# Patient Record
Sex: FEMALE | Race: WHITE | Employment: FULL TIME | URBAN - METROPOLITAN AREA
[De-identification: names, ages, dates, MRNs, and addresses within clinical notes are randomized per-mention and may not be internally consistent; named-entity substitution may affect disease eponyms.]

---

## 2021-09-04 ENCOUNTER — OFFICE VISIT (OUTPATIENT)
Dept: FAMILY MEDICINE CLINIC | Facility: CLINIC | Age: 46
End: 2021-09-04
Payer: COMMERCIAL

## 2021-09-04 VITALS
WEIGHT: 182 LBS | TEMPERATURE: 98 F | SYSTOLIC BLOOD PRESSURE: 124 MMHG | DIASTOLIC BLOOD PRESSURE: 87 MMHG | HEIGHT: 67.5 IN | HEART RATE: 91 BPM | BODY MASS INDEX: 28.23 KG/M2 | RESPIRATION RATE: 16 BRPM

## 2021-09-04 DIAGNOSIS — L72.3 SEBACEOUS CYST: ICD-10-CM

## 2021-09-04 DIAGNOSIS — Z12.11 COLON CANCER SCREENING: ICD-10-CM

## 2021-09-04 DIAGNOSIS — E05.90 HYPERTHYROIDISM: ICD-10-CM

## 2021-09-04 DIAGNOSIS — E11.9 TYPE 2 DIABETES MELLITUS WITHOUT COMPLICATION, WITHOUT LONG-TERM CURRENT USE OF INSULIN (HCC): Primary | ICD-10-CM

## 2021-09-04 DIAGNOSIS — Z12.31 ENCOUNTER FOR SCREENING MAMMOGRAM FOR MALIGNANT NEOPLASM OF BREAST: ICD-10-CM

## 2021-09-04 DIAGNOSIS — E78.00 PURE HYPERCHOLESTEROLEMIA: ICD-10-CM

## 2021-09-04 LAB
ALBUMIN SERPL-MCNC: 3.5 G/DL (ref 3.4–5)
ALBUMIN/GLOB SERPL: 0.7 {RATIO} (ref 1–2)
ALP LIVER SERPL-CCNC: 97 U/L
ALT SERPL-CCNC: 62 U/L
ANION GAP SERPL CALC-SCNC: 8 MMOL/L (ref 0–18)
AST SERPL-CCNC: 40 U/L (ref 15–37)
BILIRUB SERPL-MCNC: 0.4 MG/DL (ref 0.1–2)
BUN BLD-MCNC: 11 MG/DL (ref 7–18)
BUN/CREAT SERPL: 15.3 (ref 10–20)
CALCIUM BLD-MCNC: 8.7 MG/DL (ref 8.5–10.1)
CARTRIDGE LOT#: 845 NUMERIC
CHLORIDE SERPL-SCNC: 105 MMOL/L (ref 98–112)
CHOLEST SMN-MCNC: 203 MG/DL (ref ?–200)
CO2 SERPL-SCNC: 24 MMOL/L (ref 21–32)
CREAT BLD-MCNC: 0.72 MG/DL
CREAT UR-SCNC: 49.1 MG/DL
GLOBULIN PLAS-MCNC: 4.9 G/DL (ref 2.8–4.4)
GLUCOSE BLD-MCNC: 271 MG/DL (ref 70–99)
HDLC SERPL-MCNC: 40 MG/DL (ref 40–59)
HEMOGLOBIN A1C: 11 % (ref 4.3–5.6)
LDLC SERPL CALC-MCNC: 144 MG/DL (ref ?–100)
M PROTEIN MFR SERPL ELPH: 8.4 G/DL (ref 6.4–8.2)
MICROALBUMIN UR-MCNC: 1.09 MG/DL
MICROALBUMIN/CREAT 24H UR-RTO: 22.2 UG/MG (ref ?–30)
NONHDLC SERPL-MCNC: 163 MG/DL (ref ?–130)
OSMOLALITY SERPL CALC.SUM OF ELEC: 293 MOSM/KG (ref 275–295)
PATIENT FASTING Y/N/NP: NO
PATIENT FASTING Y/N/NP: NO
POTASSIUM SERPL-SCNC: 4 MMOL/L (ref 3.5–5.1)
SODIUM SERPL-SCNC: 137 MMOL/L (ref 136–145)
TRIGL SERPL-MCNC: 102 MG/DL (ref 30–149)
TSI SER-ACNC: 1.39 MIU/ML (ref 0.36–3.74)
VLDLC SERPL CALC-MCNC: 19 MG/DL (ref 0–30)

## 2021-09-04 PROCEDURE — 83036 HEMOGLOBIN GLYCOSYLATED A1C: CPT | Performed by: FAMILY MEDICINE

## 2021-09-04 PROCEDURE — 3079F DIAST BP 80-89 MM HG: CPT | Performed by: FAMILY MEDICINE

## 2021-09-04 PROCEDURE — 3008F BODY MASS INDEX DOCD: CPT | Performed by: FAMILY MEDICINE

## 2021-09-04 PROCEDURE — 3046F HEMOGLOBIN A1C LEVEL >9.0%: CPT | Performed by: FAMILY MEDICINE

## 2021-09-04 PROCEDURE — 99204 OFFICE O/P NEW MOD 45 MIN: CPT | Performed by: FAMILY MEDICINE

## 2021-09-04 PROCEDURE — 3074F SYST BP LT 130 MM HG: CPT | Performed by: FAMILY MEDICINE

## 2021-09-04 PROCEDURE — 36415 COLL VENOUS BLD VENIPUNCTURE: CPT | Performed by: FAMILY MEDICINE

## 2021-09-04 PROCEDURE — 3061F NEG MICROALBUMINURIA REV: CPT | Performed by: INTERNAL MEDICINE

## 2021-09-04 RX ORDER — GLYBURIDE 5 MG/1
5 TABLET ORAL
Qty: 90 TABLET | Refills: 3 | Status: SHIPPED | OUTPATIENT
Start: 2021-09-04 | End: 2021-10-27

## 2021-09-04 RX ORDER — PROPRANOLOL HYDROCHLORIDE 10 MG/1
10 TABLET ORAL DAILY
COMMUNITY
Start: 2021-04-02 | End: 2021-09-04

## 2021-09-04 RX ORDER — METHIMAZOLE 5 MG/1
5 TABLET ORAL DAILY
COMMUNITY
End: 2021-09-04

## 2021-09-04 RX ORDER — METHIMAZOLE 5 MG/1
5 TABLET ORAL DAILY
Qty: 30 TABLET | Refills: 11 | Status: SHIPPED | OUTPATIENT
Start: 2021-09-04

## 2021-09-04 RX ORDER — ATORVASTATIN CALCIUM 20 MG/1
20 TABLET, FILM COATED ORAL NIGHTLY
Qty: 90 TABLET | Refills: 3 | Status: SHIPPED | OUTPATIENT
Start: 2021-09-04 | End: 2021-10-27

## 2021-09-04 RX ORDER — PROPRANOLOL HYDROCHLORIDE 10 MG/1
10 TABLET ORAL DAILY
Qty: 90 TABLET | Refills: 3 | Status: SHIPPED | OUTPATIENT
Start: 2021-09-04

## 2021-09-04 NOTE — PROGRESS NOTES
Eugenia Dutton is a 40year old female.   Patient presents with:  Establish Care  Cyst      HPI:   Patient is a 67-year-old female who presents today to discuss her hyperthyroidism, her uncontrolled diabetes with a hemoglobin A1c of 11, her infected sebace palpitations, swelling in feet  GI: denies abdominal pain and denies heartburn, nausea or vomiting  : No Pain on urination, change in the color of urine, discharge, urinating frequently  MUS: No back pain, joint pain, muscle pain  NEURO: denies headaches MG Oral Tab; Take 1 tablet (5 mg total) by mouth daily with breakfast.  Dispense: 90 tablet; Refill: 3  - DIABETIC EDUCATION - INTERNAL    4. Pure hypercholesterolemia  Patient has been on atorvastatin in the past.  Was DC'd for unknown reason.   Will check

## 2021-10-27 ENCOUNTER — HOSPITAL ENCOUNTER (OUTPATIENT)
Dept: MAMMOGRAPHY | Facility: HOSPITAL | Age: 46
Discharge: HOME | End: 2021-10-27
Attending: FAMILY MEDICINE
Payer: COMMERCIAL

## 2021-10-27 ENCOUNTER — OFFICE VISIT (OUTPATIENT)
Dept: ENDOCRINOLOGY CLINIC | Facility: CLINIC | Age: 46
End: 2021-10-27
Payer: COMMERCIAL

## 2021-10-27 ENCOUNTER — LAB ENCOUNTER (OUTPATIENT)
Dept: LAB | Facility: HOSPITAL | Age: 46
End: 2021-10-27
Attending: FAMILY MEDICINE
Payer: COMMERCIAL

## 2021-10-27 VITALS
DIASTOLIC BLOOD PRESSURE: 89 MMHG | RESPIRATION RATE: 18 BRPM | BODY MASS INDEX: 28.23 KG/M2 | HEIGHT: 67.5 IN | HEART RATE: 88 BPM | SYSTOLIC BLOOD PRESSURE: 128 MMHG | WEIGHT: 182 LBS

## 2021-10-27 DIAGNOSIS — E05.90 HYPERTHYROIDISM: Primary | ICD-10-CM

## 2021-10-27 DIAGNOSIS — E11.9 TYPE 2 DIABETES MELLITUS WITHOUT COMPLICATION, WITHOUT LONG-TERM CURRENT USE OF INSULIN (HCC): ICD-10-CM

## 2021-10-27 DIAGNOSIS — E05.90 HYPERTHYROIDISM: ICD-10-CM

## 2021-10-27 DIAGNOSIS — E78.5 HYPERLIPIDEMIA, UNSPECIFIED HYPERLIPIDEMIA TYPE: ICD-10-CM

## 2021-10-27 DIAGNOSIS — Z12.31 ENCOUNTER FOR SCREENING MAMMOGRAM FOR MALIGNANT NEOPLASM OF BREAST: ICD-10-CM

## 2021-10-27 PROCEDURE — 84443 ASSAY THYROID STIM HORMONE: CPT

## 2021-10-27 PROCEDURE — 3008F BODY MASS INDEX DOCD: CPT | Performed by: INTERNAL MEDICINE

## 2021-10-27 PROCEDURE — 77067 SCR MAMMO BI INCL CAD: CPT | Performed by: FAMILY MEDICINE

## 2021-10-27 PROCEDURE — 84445 ASSAY OF TSI GLOBULIN: CPT

## 2021-10-27 PROCEDURE — 3079F DIAST BP 80-89 MM HG: CPT | Performed by: INTERNAL MEDICINE

## 2021-10-27 PROCEDURE — 3074F SYST BP LT 130 MM HG: CPT | Performed by: INTERNAL MEDICINE

## 2021-10-27 PROCEDURE — 77063 BREAST TOMOSYNTHESIS BI: CPT | Performed by: FAMILY MEDICINE

## 2021-10-27 PROCEDURE — 99244 OFF/OP CNSLTJ NEW/EST MOD 40: CPT | Performed by: INTERNAL MEDICINE

## 2021-10-27 PROCEDURE — 84439 ASSAY OF FREE THYROXINE: CPT

## 2021-10-27 PROCEDURE — 84480 ASSAY TRIIODOTHYRONINE (T3): CPT

## 2021-10-27 PROCEDURE — 36415 COLL VENOUS BLD VENIPUNCTURE: CPT

## 2021-10-27 RX ORDER — ATORVASTATIN CALCIUM 20 MG/1
20 TABLET, FILM COATED ORAL NIGHTLY
Qty: 90 TABLET | Refills: 0 | Status: SHIPPED | OUTPATIENT
Start: 2021-10-27 | End: 2022-01-25

## 2021-10-27 RX ORDER — DULAGLUTIDE 0.75 MG/.5ML
0.75 INJECTION, SOLUTION SUBCUTANEOUS WEEKLY
Qty: 2 ML | Refills: 0 | Status: SHIPPED | OUTPATIENT
Start: 2021-10-27 | End: 2021-11-05

## 2021-10-27 RX ORDER — EMPAGLIFLOZIN 25 MG/1
25 TABLET, FILM COATED ORAL DAILY
Qty: 90 TABLET | Refills: 0 | Status: SHIPPED | OUTPATIENT
Start: 2021-10-27 | End: 2022-01-25

## 2021-10-27 RX ORDER — GLYBURIDE 5 MG/1
10 TABLET ORAL
Qty: 180 TABLET | Refills: 0 | Status: SHIPPED | OUTPATIENT
Start: 2021-10-27 | End: 2022-01-13

## 2021-10-27 NOTE — H&P
Name: Chago Oquendo  Date: 10/27/2021    Referring Physician: No ref. provider found    HISTORY OF PRESENT ILLNESS   Chago Oquendo is a 40year old female who presents for evaluation and management of type 2 diabetes.  She was diagnosed with diabetes ab glyBURIDE 5 MG Oral Tab, Take 1 tablet (5 mg total) by mouth daily with breakfast., Disp: 90 tablet, Rfl: 3     Allergies:   No Known Allergies    Social History:   Social History    Tobacco Use      Smoking status: Never Smoker      Smokeless tobacco: Nev 137 09/04/2021    K 4.0 09/04/2021     09/04/2021    CO2 24.0 09/04/2021     Lab Results   Component Value Date    CHOLEST 203 (H) 09/04/2021    TRIG 102 09/04/2021    HDL 40 09/04/2021     (H) 09/04/2021    VLDL 19 09/04/2021    NONHDLC 163 ( encounter, I spent over 20 minutes with preparing for the visit, reviewing documents from other specialties as well as from PCP, and going over test results.  During the face to face encounter, I spent an additional 30 minutes which were determined for hist

## 2021-11-05 DIAGNOSIS — E11.9 TYPE 2 DIABETES MELLITUS WITHOUT COMPLICATION, WITHOUT LONG-TERM CURRENT USE OF INSULIN (HCC): ICD-10-CM

## 2021-11-08 RX ORDER — DULAGLUTIDE 0.75 MG/.5ML
INJECTION, SOLUTION SUBCUTANEOUS
Qty: 2 ML | Refills: 2 | Status: SHIPPED | OUTPATIENT
Start: 2021-11-08

## 2021-12-14 ENCOUNTER — TELEPHONE (OUTPATIENT)
Dept: ENDOCRINOLOGY CLINIC | Facility: CLINIC | Age: 46
End: 2021-12-14

## 2021-12-15 NOTE — TELEPHONE ENCOUNTER
Hi!  Can we call this patient and ask her for how long she has been on the methimazole for? Please also let her know that I had reviewed her thyroid function tests and was waiting for her follow up visit to discuss them with her. Her thyroid function tests are within normal limits and depending upon how long she has been on the medication, we can decide whether or not she needs to continue it or not. Thank you!

## 2022-01-13 RX ORDER — GLYBURIDE 5 MG/1
TABLET ORAL
Qty: 180 TABLET | Refills: 0 | Status: SHIPPED | OUTPATIENT
Start: 2022-01-13

## 2022-01-13 NOTE — TELEPHONE ENCOUNTER
Hi!  Please call her and let her know that we will refill the medicatins this once, but if she cannot make a follow up appt, then we cant refill the medications anymore. Thank you!

## 2022-06-13 DIAGNOSIS — E11.9 TYPE 2 DIABETES MELLITUS WITHOUT COMPLICATION, WITHOUT LONG-TERM CURRENT USE OF INSULIN (HCC): ICD-10-CM

## 2022-06-14 NOTE — TELEPHONE ENCOUNTER
LOV 10/27/21  Called and got no answer. LMTCB. Letter sent on 2/15/22  Pended 1 month supply for review.      Please see refill request from 1/9/22

## 2022-06-15 RX ORDER — GLYBURIDE 5 MG/1
TABLET ORAL
Qty: 60 TABLET | Refills: 0 | Status: SHIPPED | OUTPATIENT
Start: 2022-06-15

## 2022-10-24 NOTE — TELEPHONE ENCOUNTER
Please review refill failed/no protocol     Requested Prescriptions     Pending Prescriptions Disp Refills    METFORMIN HCL 1000 MG Oral Tab [Pharmacy Med Name: METFORMIN 1000MG TABLETS] 180 tablet 3     Sig: TAKE 1 TABLET(1000 MG) BY MOUTH TWICE DAILY         Recent Visits  Date Type Provider Dept   09/04/21 Office Visit Barrie Garevy MD [facility]   Showing recent visits within past 540 days with a meds authorizing provider and meeting all other requirements  Future Appointments  No visits were found meeting these conditions.   Showing future appointments within next 150 days with a meds authorizing provider and meeting all other requirements    Requested Prescriptions   Pending Prescriptions Disp Refills    METFORMIN HCL 1000 MG Oral Tab [Pharmacy Med Name: METFORMIN 1000MG TABLETS] 180 tablet 3     Sig: TAKE 1 TABLET(1000 MG) BY MOUTH TWICE DAILY        Diabetes Medication Protocol Failed - 10/23/2022  8:00 AM        Failed - Last A1C < 7.5 and within past 6 months     Lab Results   Component Value Date    A1C 11.0 (A) 09/04/2021               Failed - In person appointment or virtual visit in the past 6 mos or appointment in next 3 mos       Recent Outpatient Visits              12 months ago Hyperthyroidism    [address] [facility] Margaret Zapata MD    Office Visit    1 year ago Type 2 diabetes mellitus without complication, without long-term current use of insulin [facility])    [address] [facility], [facility] Barrie Garvey MD    Office Visit                 Failed - EGFRCR or GFRNAA > 50     GFR Evaluation              Failed - GFR in the past 12 months              Recent Outpatient Visits              12 months ago Hyperthyroidism    [address] [facility] Margaret Zapata MD    Office Visit    1 year ago Type 2 diabetes mellitus without complication, without long-term current use of insulin [facility])    [facility] Flora Melvin MD Office Visit

## 2022-11-20 RX ORDER — PROPRANOLOL HYDROCHLORIDE 10 MG/1
TABLET ORAL
Qty: 90 TABLET | Refills: 3 | OUTPATIENT
Start: 2022-11-20

## 2022-11-28 RX ORDER — METHIMAZOLE 5 MG/1
5 TABLET ORAL DAILY
Qty: 90 TABLET | Refills: 0 | Status: SHIPPED | OUTPATIENT
Start: 2022-11-28

## 2022-11-28 RX ORDER — PROPRANOLOL HYDROCHLORIDE 10 MG/1
10 TABLET ORAL DAILY
Qty: 90 TABLET | Refills: 0 | Status: SHIPPED | OUTPATIENT
Start: 2022-11-28

## 2022-11-28 NOTE — TELEPHONE ENCOUNTER
Sent to wrong provider. Resent to Dr. Anny Funk. Pended. Metformin sent in on 10/25/22 for 3 months. He should have enough until January.

## 2023-02-03 NOTE — TELEPHONE ENCOUNTER
Please review. Protocol failed / No protocol.     Requested Prescriptions   Pending Prescriptions Disp Refills    METFORMIN HCL 1000 MG Oral Tab [Pharmacy Med Name: METFORMIN 1000MG TABLETS] 180 tablet 0     Sig: TAKE 1 TABLET(1000 MG) BY MOUTH TWICE DAILY       Diabetes Medication Protocol Failed - 2/3/2023  1:06 PM        Failed - Last A1C < 7.5 and within past 6 months     Lab Results   Component Value Date    A1C 11.0 (A) 09/04/2021             Failed - EGFRCR or GFRNAA > 50     GFR Evaluation            Failed - GFR in the past 12 months        Passed - In person appointment or virtual visit in the past 6 mos or appointment in next 3 mos     Recent Outpatient Visits              1 year ago Hyperthyroidism    Ashely Alvarenga MD    Office Visit    1 year ago Type 2 diabetes mellitus without complication, without long-term current use of insulin ([facility]    Steen Cushing, MD    Office Visit          Future Appointments         Provider Department Appt Notes    Tomorrow Major Mcdonnell MD [address] [facility], [facility], [facility] ck up --per Dr to come                     Recent Outpatient Visits              1 year ago Hyperthyroidism    Suma English MD    Office Visit    1 year ago Type 2 diabetes mellitus without complication, without long-term current use of insulin [facility])    Cathi Boyce, Chang Asencio MD    Office Visit             Future Appointments         Provider Department Appt Notes    Tomorrow MD Cathi Riley, [facility] ck up --per Dr to come

## 2023-02-04 ENCOUNTER — OFFICE VISIT (OUTPATIENT)
Dept: FAMILY MEDICINE CLINIC | Facility: CLINIC | Age: 48
End: 2023-02-04

## 2023-02-04 VITALS
HEART RATE: 89 BPM | BODY MASS INDEX: 27.13 KG/M2 | SYSTOLIC BLOOD PRESSURE: 122 MMHG | HEIGHT: 68 IN | WEIGHT: 179 LBS | TEMPERATURE: 98 F | DIASTOLIC BLOOD PRESSURE: 85 MMHG

## 2023-02-04 DIAGNOSIS — E11.9 TYPE 2 DIABETES MELLITUS WITHOUT COMPLICATION, WITHOUT LONG-TERM CURRENT USE OF INSULIN (HCC): Primary | ICD-10-CM

## 2023-02-04 DIAGNOSIS — L72.3 SEBACEOUS CYST: ICD-10-CM

## 2023-02-04 DIAGNOSIS — Z12.31 ENCOUNTER FOR SCREENING MAMMOGRAM FOR MALIGNANT NEOPLASM OF BREAST: ICD-10-CM

## 2023-02-04 DIAGNOSIS — E05.90 HYPERTHYROIDISM: ICD-10-CM

## 2023-02-04 DIAGNOSIS — E78.5 HYPERLIPIDEMIA, UNSPECIFIED HYPERLIPIDEMIA TYPE: ICD-10-CM

## 2023-02-04 LAB
ALBUMIN SERPL-MCNC: 3.7 G/DL (ref 3.4–5)
ALBUMIN/GLOB SERPL: 0.9 {RATIO} (ref 1–2)
ALP LIVER SERPL-CCNC: 82 U/L
ALT SERPL-CCNC: 142 U/L
ANION GAP SERPL CALC-SCNC: 7 MMOL/L (ref 0–18)
AST SERPL-CCNC: 119 U/L (ref 15–37)
BASOPHILS # BLD AUTO: 0.07 X10(3) UL (ref 0–0.2)
BASOPHILS NFR BLD AUTO: 0.9 %
BILIRUB SERPL-MCNC: 0.5 MG/DL (ref 0.1–2)
BUN BLD-MCNC: 10 MG/DL (ref 7–18)
BUN/CREAT SERPL: 12.5 (ref 10–20)
CALCIUM BLD-MCNC: 9.7 MG/DL (ref 8.5–10.1)
CARTRIDGE LOT#: 36 NUMERIC
CHLORIDE SERPL-SCNC: 109 MMOL/L (ref 98–112)
CHOLEST SERPL-MCNC: 196 MG/DL (ref ?–200)
CO2 SERPL-SCNC: 23 MMOL/L (ref 21–32)
CREAT BLD-MCNC: 0.8 MG/DL
CREAT UR-SCNC: 62.4 MG/DL
DEPRECATED RDW RBC AUTO: 45.7 FL (ref 35.1–46.3)
EOSINOPHIL # BLD AUTO: 0.16 X10(3) UL (ref 0–0.7)
EOSINOPHIL NFR BLD AUTO: 2.1 %
ERYTHROCYTE [DISTWIDTH] IN BLOOD BY AUTOMATED COUNT: 16.4 % (ref 11–15)
FASTING PATIENT LIPID ANSWER: NO
FASTING STATUS PATIENT QL REPORTED: NO
GFR SERPLBLD BASED ON 1.73 SQ M-ARVRAT: 92 ML/MIN/1.73M2 (ref 60–?)
GLOBULIN PLAS-MCNC: 4.3 G/DL (ref 2.8–4.4)
GLUCOSE BLD-MCNC: 301 MG/DL (ref 70–99)
HCT VFR BLD AUTO: 42.2 %
HDLC SERPL-MCNC: 43 MG/DL (ref 40–59)
HEMOGLOBIN A1C: 12.5 % (ref 4.3–5.6)
HGB BLD-MCNC: 12.7 G/DL
IMM GRANULOCYTES # BLD AUTO: 0.01 X10(3) UL (ref 0–1)
IMM GRANULOCYTES NFR BLD: 0.1 %
LDLC SERPL CALC-MCNC: 122 MG/DL (ref ?–100)
LYMPHOCYTES # BLD AUTO: 2.08 X10(3) UL (ref 1–4)
LYMPHOCYTES NFR BLD AUTO: 26.8 %
MCH RBC QN AUTO: 23.2 PG (ref 26–34)
MCHC RBC AUTO-ENTMCNC: 30.1 G/DL (ref 31–37)
MCV RBC AUTO: 77 FL
MICROALBUMIN UR-MCNC: 5.96 MG/DL
MICROALBUMIN/CREAT 24H UR-RTO: 95.5 UG/MG (ref ?–30)
MONOCYTES # BLD AUTO: 0.43 X10(3) UL (ref 0.1–1)
MONOCYTES NFR BLD AUTO: 5.5 %
NEUTROPHILS # BLD AUTO: 5.02 X10 (3) UL (ref 1.5–7.7)
NEUTROPHILS # BLD AUTO: 5.02 X10(3) UL (ref 1.5–7.7)
NEUTROPHILS NFR BLD AUTO: 64.6 %
NONHDLC SERPL-MCNC: 153 MG/DL (ref ?–130)
OSMOLALITY SERPL CALC.SUM OF ELEC: 298 MOSM/KG (ref 275–295)
PLATELET # BLD AUTO: 399 10(3)UL (ref 150–450)
POTASSIUM SERPL-SCNC: 4.4 MMOL/L (ref 3.5–5.1)
PROT SERPL-MCNC: 8 G/DL (ref 6.4–8.2)
RBC # BLD AUTO: 5.48 X10(6)UL
SODIUM SERPL-SCNC: 139 MMOL/L (ref 136–145)
TRIGL SERPL-MCNC: 172 MG/DL (ref 30–149)
TSI SER-ACNC: 0.94 MIU/ML (ref 0.36–3.74)
VLDLC SERPL CALC-MCNC: 31 MG/DL (ref 0–30)
WBC # BLD AUTO: 7.8 X10(3) UL (ref 4–11)

## 2023-02-04 PROCEDURE — 90677 PCV20 VACCINE IM: CPT | Performed by: FAMILY MEDICINE

## 2023-02-04 PROCEDURE — 3074F SYST BP LT 130 MM HG: CPT | Performed by: FAMILY MEDICINE

## 2023-02-04 PROCEDURE — 3008F BODY MASS INDEX DOCD: CPT | Performed by: FAMILY MEDICINE

## 2023-02-04 PROCEDURE — 3060F POS MICROALBUMINURIA REV: CPT | Performed by: FAMILY MEDICINE

## 2023-02-04 PROCEDURE — 99215 OFFICE O/P EST HI 40 MIN: CPT | Performed by: FAMILY MEDICINE

## 2023-02-04 PROCEDURE — 90715 TDAP VACCINE 7 YRS/> IM: CPT | Performed by: FAMILY MEDICINE

## 2023-02-04 PROCEDURE — 3061F NEG MICROALBUMINURIA REV: CPT | Performed by: FAMILY MEDICINE

## 2023-02-04 PROCEDURE — 90472 IMMUNIZATION ADMIN EACH ADD: CPT | Performed by: FAMILY MEDICINE

## 2023-02-04 PROCEDURE — 3046F HEMOGLOBIN A1C LEVEL >9.0%: CPT | Performed by: FAMILY MEDICINE

## 2023-02-04 PROCEDURE — 90471 IMMUNIZATION ADMIN: CPT | Performed by: FAMILY MEDICINE

## 2023-02-04 PROCEDURE — 3079F DIAST BP 80-89 MM HG: CPT | Performed by: FAMILY MEDICINE

## 2023-02-04 PROCEDURE — 83036 HEMOGLOBIN GLYCOSYLATED A1C: CPT | Performed by: FAMILY MEDICINE

## 2023-02-04 RX ORDER — GLYBURIDE 5 MG/1
10 TABLET ORAL
Qty: 60 TABLET | Refills: 0 | Status: SHIPPED | OUTPATIENT
Start: 2023-02-04

## 2023-02-04 RX ORDER — ATORVASTATIN CALCIUM 40 MG/1
40 TABLET, FILM COATED ORAL NIGHTLY
Qty: 90 TABLET | Refills: 3 | Status: SHIPPED | OUTPATIENT
Start: 2023-02-04

## 2023-02-04 RX ORDER — DULAGLUTIDE 0.75 MG/.5ML
0.75 INJECTION, SOLUTION SUBCUTANEOUS WEEKLY
Qty: 2 ML | Refills: 2 | Status: SHIPPED | OUTPATIENT
Start: 2023-02-04

## 2023-02-16 RX ORDER — LOSARTAN POTASSIUM 50 MG/1
50 TABLET ORAL DAILY
Qty: 90 TABLET | Refills: 3 | Status: SHIPPED | OUTPATIENT
Start: 2023-02-16

## 2023-02-22 RX ORDER — PROPRANOLOL HYDROCHLORIDE 10 MG/1
10 TABLET ORAL DAILY
Qty: 90 TABLET | Refills: 1 | Status: SHIPPED | OUTPATIENT
Start: 2023-02-22

## 2023-02-22 RX ORDER — METHIMAZOLE 5 MG/1
5 TABLET ORAL DAILY
Qty: 90 TABLET | Refills: 1 | Status: SHIPPED | OUTPATIENT
Start: 2023-02-22

## 2023-02-22 NOTE — TELEPHONE ENCOUNTER
Refill passed per [facility] protocol    Requested Prescriptions   Pending Prescriptions Disp Refills    METHIMAZOLE 5 MG Oral Tab [Pharmacy Med Name: METHIMAZOLE 5MG TABLETS] 90 tablet 0     Sig: TAKE 1 TABLET(5 MG) BY MOUTH DAILY       Hyperthyroid Medication Protocol Passed - 2/22/2023  1:05 PM        Passed - TSH resulted in past 6 months        Passed - In person appointment or virtual visit in the past 6 mos or appointment in next 3 mos     Recent Outpatient Visits              2 weeks ago Type 2 diabetes mellitus without complication, without long-term current use of insulin ([facility] [address] [facility]    [address], [address], Peggy Del Real MD    Office Visit    1 year ago Hyperthyroidism    [address], [address], [place], Ned Barron MD    Office Visit    1 year ago Type 2 diabetes mellitus without complication, without long-term current use of insulin [facility])    [address], [place] [address], Daja Theodore MD    Office Visit          Future Appointments         Provider Department Appt Notes    In 3 days [address] [place] [address] Rd     In 3 weeks Kathya Tuttle, [address], [address], Daja sanchez     In 3 weeks Warden Karyn MD [address], [address], Daja sanchez

## 2023-02-22 NOTE — TELEPHONE ENCOUNTER
Refill passed per [facility] [employer] protocol. Requested Prescriptions   Signed Prescriptions Disp Refills    propranolol 10 MG Oral Tab 90 tablet 1     Sig: Take 1 tablet (10 mg total) by mouth daily.        Hypertensive Medications Protocol Passed - 2/22/2023 12:55 PM        Passed - In person appointment in the past 12 or next 3 months     Recent Outpatient Visits              2 weeks ago Type 2 diabetes mellitus without complication, without long-term current use of insulin ([facility]    [address], [facility], Tena Estrada MD    Office Visit    1 year ago [facility], [address], [place], Koffi Leigh MD    Office Visit    1 year ago Type 2 diabetes mellitus without complication, without long-term current use of insulin ([facility]    [address], [facility], Daja Loya MD    Office Visit          Future Appointments         Provider Department Appt Notes    In 3 days [address] [place] [address]     In 3 weeks Davian Potts, [address], [place] [address], Daja sanchez     In 3 weeks Dariana Link MD [address], [place] [address], [facility] BP reading less than 140/90     BP Readings from Last 1 Encounters:  02/04/23 : 122/85              Passed - CMP or BMP in past 6 months     Recent Results (from the past 4392 hour(s))   COMP METABOLIC PANEL (14)    Collection Time: 02/04/23 10:02 AM   Result Value Ref Range    Glucose 301 (H) 70 - 99 mg/dL    Sodium 139 136 - 145 mmol/L    Potassium 4.4 3.5 - 5.1 mmol/L    Chloride 109 98 - 112 mmol/L    CO2 23.0 21.0 - 32.0 mmol/L    Anion Gap 7 0 - 18 mmol/L    BUN 10 7 - 18 mg/dL    Creatinine 0.80 0.55 - 1.02 mg/dL    BUN/CREA Ratio 12.5 10.0 - 20.0    Calcium, Total 9.7 8.5 - 10.1 mg/dL    Calculated Osmolality 298 (H) 275 - 295 mOsm/kg    eGFR-Cr 92 >=60 mL/min/1.73m2    ALT 142 (H) 13 - 56 U/L     (H) 15 - 37 U/L    Alkaline Phosphatase 82 39 - 100 U/L    Bilirubin, Total 0.5 0.1 - 2.0 mg/dL    Total Protein 8.0 6.4 - 8.2 g/dL    Albumin 3.7 3.4 - 5.0 g/dL    Globulin  4.3 2.8 - 4.4 g/dL    A/G Ratio 0.9 (L) 1.0 - 2.0    Patient Fasting for CMP? No      *Note: Due to a large number of results and/or encounters for the requested time period, some results have not been displayed. A complete set of results can be found in Results Review.                Passed - In person appointment or virtual visit in the past 6 months     Recent Outpatient Visits              2 weeks ago Type 2 diabetes mellitus without complication, without long-term current use of insulin ([facility] [address]    [address], Bennett Roth MD    Office Visit    1 year ago Hyperthyroidism    Ophel[address] Manzo, [address], [place], Ricardo De Souza MD    Office Visit    1 year ago Type 2 diabetes mellitus without complication, without long-term current use of insulin [facility])    [address], [address] Rahul Thompson MD    Office Visit          Future Appointments         Provider Department Appt Notes    In 3 days [address] [place] [address] Rd     In 3 weeks [facility], [address] [place] [address], [address], [address] [address]     In 3 weeks Jorje Reid MD [address] [facility], [address] or GFRNAA > 50     GFR Evaluation  EGFRCR: 92 , resulted on 2/4/2023                Recent Outpatient Visits              2 weeks ago Type 2 diabetes mellitus without complication, without long-term current use of insulin [facility])    Moon Delcid MD    Office Visit    1 year ago Hyperthyroidism    [address] [facility], Deisi Sibley MD    Office Visit    1 year ago Type 2 diabetes mellitus without complication, without long-term current use of insulin ([facility]    [address] N[address]co, Daja Hampton MD    Office Visit            Future Appointments         Provider Department Appt Notes    In 3 days  [address]     In 3 weeks Marlo Ang, [address], [place], Daja sanchez     In 3 weeks MD Wing Tobias [address], Daja sanchez

## 2023-03-22 DIAGNOSIS — E11.9 TYPE 2 DIABETES MELLITUS WITHOUT COMPLICATION, WITHOUT LONG-TERM CURRENT USE OF INSULIN (HCC): ICD-10-CM

## 2023-03-23 RX ORDER — GLYBURIDE 5 MG/1
TABLET ORAL
Qty: 60 TABLET | Refills: 0 | Status: SHIPPED | OUTPATIENT
Start: 2023-03-23

## 2023-03-23 NOTE — TELEPHONE ENCOUNTER
Please review. Protocol failed. Appears to have cancelled her appointment with endocrinology that was advised.   Requested Prescriptions   Pending Prescriptions Disp Refills    GLYBURIDE 5 MG Oral Tab [Pharmacy Med Name: GLYBURIDE 5MG TABLETS] 60 tablet 0     Sig: TAKE 2 TABLETS(10 MG) BY MOUTH DAILY WITH BREAKFAST       Diabetes Medication Protocol Failed - 3/22/2023 12:27 PM        Failed - Last A1C < 7.5 and within past 6 months     Lab Results   Component Value Date    A1C 12.5 (A) 02/04/2023             Passed - In person appointment or virtual visit in the past 6 mos or appointment in next 3 mos     Recent Outpatient Visits              1 month ago Type 2 diabetes mellitus without complication, without long-term current use of insulin ([facility] [address] [facility]    Ashish Davila MD    Office Visit    1 year ago Hyperthyroidism    [address] [facility], Andre Goodwin MD    Office Visit    1 year ago Type 2 diabetes mellitus without complication, without long-term current use of insulin [facility])    Ashish Davila MD    Office Visit                      Passed - [facility] or GFRNAA > 50     GFR Evaluation  EGFRCR: 92 , resulted on 2/4/2023          Passed - GFR in the past 12 months

## 2023-04-29 DIAGNOSIS — E11.9 TYPE 2 DIABETES MELLITUS WITHOUT COMPLICATION, WITHOUT LONG-TERM CURRENT USE OF INSULIN (HCC): ICD-10-CM

## 2023-05-02 RX ORDER — GLYBURIDE 5 MG/1
TABLET ORAL
Qty: 60 TABLET | Refills: 0 | Status: SHIPPED | OUTPATIENT
Start: 2023-05-02

## 2023-05-02 NOTE — TELEPHONE ENCOUNTER
Please review; protocol failed.  Or has no protocol    Requested Prescriptions   Pending Prescriptions Disp Refills    GLYBURIDE 5 MG Oral Tab [Pharmacy Med Name: GLYBURIDE 5MG TABLETS] 60 tablet 0     Sig: TAKE 2 TABLETS(10 MG) BY MOUTH DAILY WITH BREAKFAST       Diabetes Medication Protocol Failed - 4/29/2023  7:44 PM        Failed - Last A1C < 7.5 and within past 6 months     Lab Results   Component Value Date    A1C 12.5 (A) 02/04/2023               Passed - In person appointment or virtual visit in the past 6 mos or appointment in next 3 mos     Recent Outpatient Visits              2 months ago Type 2 diabetes mellitus without complication, without long-term current use of insulin ([facility]    [address], [facility], Bennett Roth MD    Office Visit    1 year ago Hyperthyroidism    [address], [address] [facility],[address], [place], Ricardo De Souza MD    Office Visit    1 year ago Type 2 diabetes mellitus without complication, without long-term current use of insulin [facility])    [address], [facility], [facility] Rahul Thompson MD    Office Visit          Future Appointments         Provider Department Appt Notes    In 1 week Rahul Thompson MD [address], [facility] [address], [facility] followup on DM; pt had to cancel w/ Dr Horacio Vasquez who is not available when pt is  (policy informed)    In 1 week Jorje Reid MD [address], [facility] [address], [facility] consult Sebaceous cyst, back, policy informed               Passed - EGFRCR or GFRNAA > 50     GFR Evaluation  EGFRCR: 92 , resulted on 2/4/2023            Passed - GFR in the past 12 months              Recent Outpatient Visits              2 months ago Type 2 diabetes mellitus without complication, without long-term current use of insulin [facility])    Moon Delcid MD    Office Visit    1 year ago Hyperthyroidism [address] [facility], Ayanna Roland MD    Office Visit    1 year ago Type 2 diabetes mellitus without complication, without long-term current use of insulin [facility])    [address], [facility], [facility] Sabrina Sanchez MD    Office Visit           Future Appointments         Provider Department Appt Notes    In 1 week Sabrina Sanchez  [facility] followup on DM; pt had to cancel w/ Dr Kat White who is not available when pt is  (policy informed)    In 1 week Lis Ruiz MD [address], [facility] [address], [facility] consult Sebaceous cyst, back, policy informed

## 2023-05-23 NOTE — TELEPHONE ENCOUNTER
Please review; protocol failed/no protocol.      Requested Prescriptions   Pending Prescriptions Disp Refills    METFORMIN HCL 1000 MG Oral Tab [Pharmacy Med Name: METFORMIN 1000MG TABLETS] 180 tablet 0     Sig: TAKE 1 TABLET(1000 MG) BY MOUTH TWICE DAILY       Diabetes Medication Protocol Failed - 5/21/2023  3:12 PM        Failed - Last A1C < 7.5 and within past 6 months     Lab Results   Component Value Date    A1C 12.5 (A) 02/04/2023               Passed - In person appointment or virtual visit in the past 6 mos or appointment in next 3 mos     Recent Outpatient Visits              3 months ago Type 2 diabetes mellitus without complication, without long-term current use of insulin ([facility] [address]    [facility], Ian Vivar MD    Office Visit    1 year ago Hyperthyroidism    Arturo Hughes, [address] [facility], Stefano Denver, MD    Office Visit    1 year ago Type 2 diabetes mellitus without complication, without long-term current use of insulin [facility])    [facility] Derek Morales [facility]    Office Visit          Future Appointments         Provider Department Appt Notes    Tomorrow MD Arturo Wallace [facility] 3 month followup policy informed to pt    In 2 weeks MD Arturo Chin [facility] consult Sebaceous cyst, back, policy informed               Passed - EGFRCR or GFRNAA > 50     GFR Evaluation  EGFRCR: 92 , resulted on 2/4/2023            Passed - GFR in the past 12 months              Recent Outpatient Visits              3 months ago Type 2 diabetes mellitus without complication, without long-term current use of insulin [facility])    Ophelia Reid MD    Office Visit    1 year ago Hyperthyroidism    Arturo Hughes, [address], [place], [place] MD DESIREE    Office Visit    1 year ago Type 2 diabetes mellitus without complication, without long-term current use of insulin ([facility]    [facility], [address], [facility] Renetta Cao MD    Office Visit             Future Appointments         Provider Department Appt Notes    Tomorrow MD Lea Wells, [facility] 3 month followup policy informed to pt    In 2 weeks Conception MD Aditya Lealianet [place], [facility] consult Sebaceous cyst, back, policy informed

## 2023-05-29 RX ORDER — METHIMAZOLE 5 MG/1
5 TABLET ORAL DAILY
Qty: 30 TABLET | Refills: 3 | Status: SHIPPED | OUTPATIENT
Start: 2023-05-29

## 2023-05-29 NOTE — TELEPHONE ENCOUNTER
Refill passed per [place] [employer] protocol.   Requested Prescriptions   Pending Prescriptions Disp Refills    METHIMAZOLE 5 MG Oral Tab [Pharmacy Med Name: METHIMAZOLE 5MG TABLETS] 30 tablet 0     Sig: TAKE 1 TABLET(5 MG) BY MOUTH DAILY       Hyperthyroid Medication Protocol Passed - 5/28/2023  1:17 PM        Passed - TSH resulted in past 6 months        Passed - In person appointment or virtual visit in the past 6 mos or appointment in next 3 mos     Recent Outpatient Visits              3 months ago Type 2 diabetes mellitus without complication, without long-term current use of insulin ([facility]    [facility], [address], Yajaira Elise MD    Office Visit    1 year ago Hyperthyroidism    [address], [address], [place], Ayanna Roland MD    Office Visit    1 year ago Type 2 diabetes mellitus without complication, without long-term current use of insulin [facility])    [address], [address], Daja Sanchez MD    Office Visit          Future Appointments         Provider Department Appt Notes    In 1 week Lis Ruiz MD [address], [address], Daja sanchez consult Sebaceous cyst, back, policy informed                  Recent Outpatient Visits              3 months ago Type 2 diabetes mellitus without complication, without long-term current use of insulin [facility])    [address], Enrique George MD    Office Visit    1 year ago Hyperthyroidism    [address], [address], Christine Marroquin MD    Office Visit    1 year ago Type 2 diabetes mellitus without complication, without long-term current use of insulin [facility])    [address], [address], Daja Sanchez MD    Office Visit          Future Appointments         Provider Department Appt Notes    In 1 week Lis Ruiz MD [address] [facility] [address], [facility] consult Sebaceous cyst, back, policy informed

## 2023-06-07 ENCOUNTER — OFFICE VISIT (OUTPATIENT)
Dept: SURGERY | Facility: CLINIC | Age: 48
End: 2023-06-07

## 2023-06-07 VITALS — HEART RATE: 98 BPM | SYSTOLIC BLOOD PRESSURE: 127 MMHG | DIASTOLIC BLOOD PRESSURE: 86 MMHG

## 2023-06-07 DIAGNOSIS — L72.0 EPIDERMOID CYST: Primary | ICD-10-CM

## 2023-06-07 PROCEDURE — 3079F DIAST BP 80-89 MM HG: CPT | Performed by: SURGERY

## 2023-06-07 PROCEDURE — 99203 OFFICE O/P NEW LOW 30 MIN: CPT | Performed by: SURGERY

## 2023-06-07 PROCEDURE — 3074F SYST BP LT 130 MM HG: CPT | Performed by: SURGERY

## 2023-06-07 PROCEDURE — 11402 EXC TR-EXT B9+MARG 1.1-2 CM: CPT | Performed by: SURGERY

## 2023-06-07 NOTE — PROCEDURES
Surgery procedure note    Timeout performed and consent signed back prepped and draped with Betadine in a sterile fashion. 1% lidocaine with epinephrine infiltrated circumferentially. 2 cm elliptical incision is made and the cyst is completely excised including old scar. Hemostasis maintained with compression incision closed in interrupted 4-0 Prolene. Sterile dressing and Tegaderm applied. She tolerated this well.

## 2023-06-07 NOTE — PATIENT INSTRUCTIONS
Ice pack as needed. May shower in 24 hours. Tylenol or ibuprofen for discomfort. Avoid vigorous exercise 1 week    Remove outer Tegaderm dressing in 48 hours, cover sutures with a Band-Aid.   Follow-up 1 week for suture removal

## 2023-06-14 ENCOUNTER — OFFICE VISIT (OUTPATIENT)
Dept: SURGERY | Facility: CLINIC | Age: 48
End: 2023-06-14

## 2023-06-14 DIAGNOSIS — Z98.890 POST-OPERATIVE STATE: Primary | ICD-10-CM

## 2023-06-14 PROCEDURE — 99024 POSTOP FOLLOW-UP VISIT: CPT | Performed by: SURGERY

## 2023-06-14 NOTE — PROGRESS NOTES
Postoperative Patient Follow-up      6/14/2023    DINA      Arlo Dakin is a 56year old female postop after excision of recurrent epidermoid cyst.  She is here today for suture removal.      Exam  Wound is clean dry intact  Sutures removed      Assessment/Plan  Assessment   Post-operative state  (primary encounter diagnosis)    Follow-up for problems         Cleve Hinojosa MD

## 2023-07-28 ENCOUNTER — TELEPHONE (OUTPATIENT)
Dept: FAMILY MEDICINE CLINIC | Facility: CLINIC | Age: 48
End: 2023-07-28

## 2023-07-28 NOTE — TELEPHONE ENCOUNTER
Patient is requesting an appointment for a Hospital F/U. Patient is requesting as soon as possible. Patient taking care of mother, and lost her  last week. Patient goes back to work 8-4, would like to see her before that.   Please call [phone]

## 2023-07-31 NOTE — TELEPHONE ENCOUNTER
Patient called requesting a call back she stated will be returning back to work and is requesting this to be a high priority for reason for missing visit of 07/20 was passing in the family and patient is taking care of mother

## 2023-08-01 NOTE — TELEPHONE ENCOUNTER
Appointment scheduled tomorrow 8/2/23 at 9:40 with Dr. Karla Dietrich.  Patient aware location is at [facility]

## 2023-08-02 ENCOUNTER — OFFICE VISIT (OUTPATIENT)
Dept: FAMILY MEDICINE CLINIC | Facility: CLINIC | Age: 48
End: 2023-08-02

## 2023-08-02 VITALS
DIASTOLIC BLOOD PRESSURE: 84 MMHG | SYSTOLIC BLOOD PRESSURE: 112 MMHG | HEART RATE: 83 BPM | BODY MASS INDEX: 28 KG/M2 | WEIGHT: 187 LBS

## 2023-08-02 DIAGNOSIS — E11.9 TYPE 2 DIABETES MELLITUS WITHOUT COMPLICATION, WITHOUT LONG-TERM CURRENT USE OF INSULIN (HCC): Primary | ICD-10-CM

## 2023-08-02 LAB
CARTRIDGE LOT#: 603 NUMERIC
HEMOGLOBIN A1C: 9.2 % (ref 4.3–5.6)

## 2023-08-02 PROCEDURE — 99214 OFFICE O/P EST MOD 30 MIN: CPT | Performed by: FAMILY MEDICINE

## 2023-08-02 PROCEDURE — 3079F DIAST BP 80-89 MM HG: CPT | Performed by: FAMILY MEDICINE

## 2023-08-02 PROCEDURE — 3074F SYST BP LT 130 MM HG: CPT | Performed by: FAMILY MEDICINE

## 2023-08-02 PROCEDURE — 83036 HEMOGLOBIN GLYCOSYLATED A1C: CPT | Performed by: FAMILY MEDICINE

## 2023-08-02 PROCEDURE — 3046F HEMOGLOBIN A1C LEVEL >9.0%: CPT | Performed by: FAMILY MEDICINE

## 2023-08-02 RX ORDER — DULAGLUTIDE 0.75 MG/.5ML
0.75 INJECTION, SOLUTION SUBCUTANEOUS WEEKLY
Qty: 2 ML | Refills: 2 | Status: SHIPPED | OUTPATIENT
Start: 2023-08-02

## 2023-08-02 RX ORDER — ATORVASTATIN CALCIUM 40 MG/1
40 TABLET, FILM COATED ORAL NIGHTLY
Qty: 90 TABLET | Refills: 3 | Status: SHIPPED | OUTPATIENT
Start: 2023-08-02

## 2023-08-02 RX ORDER — PREDNISONE 20 MG/1
TABLET ORAL
COMMUNITY
Start: 2023-07-26 | End: 2023-08-04

## 2023-08-05 NOTE — TELEPHONE ENCOUNTER
A1C out of range for protocol, please advise on refill request.    Requested Prescriptions     Pending Prescriptions Disp Refills    METFORMIN HCL 1000 MG Oral Tab [Pharmacy Med Name: METFORMIN 1000MG TABLETS] 180 tablet 0     Sig: TAKE 1 TABLET(1000 MG) BY MOUTH TWICE DAILY      Recent Visits  Date Type Provider Dept   08/02/23 Office Visit Umair Domingo MD [facility]   02/04/23 Office Visit Umair Domingo MD [facility]   Showing recent visits within past 540 days with a meds authorizing provider and meeting all other requirements  Future Appointments  Date Type Provider Dept   08/08/23 Appointment Umair Domingo MD [facility]   Showing future appointments within next 150 days with a meds authorizing provider and meeting all other requirements     Requested Prescriptions   Pending Prescriptions Disp Refills    METFORMIN HCL 1000 MG Oral Tab [Pharmacy Med Name: METFORMIN 1000MG TABLETS] 180 tablet 0     Sig: TAKE 1 TABLET(1000 MG) BY MOUTH TWICE DAILY       Diabetes Medication Protocol Failed - 8/4/2023 10:05 AM        Failed - Last A1C < 7.5 and within past 6 months     Lab Results   Component Value Date    A1C 9.2 (A) 08/02/2023             Passed - In person appointment or virtual visit in the past 6 mos or appointment in next 3 mos     Recent Outpatient Visits              3 days ago Type 2 diabetes mellitus without complication, without long-term current use of insulin [facility])    [address] Umair Domingo MD    Office Visit    1 month ago Post-operative state    [address], Camila Conley MD    Office Visit    1 month ago Epidermoid cyst    [address] [place] [address], Camila Conley MD    Office Visit    6 months ago Type 2 diabetes mellitus without complication, without long-term current use of insulin [facility])    [address], [address] Larissa Theodore MD    Office Visit    1 year ago Hyperthyroidism    Marla Martínez, [address], [place], Ned Barron MD    Office Visit          Future Appointments         Provider Department Appt Notes    In 3 days MD Marla Esquivel [address], [facility] follow up - per MD               Passed - EGFRCR or GFRNAA > 50     GFR Evaluation  EGFRCR: 92 , resulted on 2/4/2023          Passed - GFR in the past 12 months            Future Appointments         Provider Department Appt Notes    In 3 days MD Marla Esquivel [address], [facility] follow up - per MD           Recent Outpatient Visits              3 days ago Type 2 diabetes mellitus without complication, without long-term current use of insulin [facility])    Mei Jack MD    Office Visit    1 month ago Post-operative state    Alana Jack MD    Office Visit    1 month ago Epidermoid cyst    Brnadi Acevedo MD    Office Visit    6 months ago Type 2 diabetes mellitus without complication, without long-term current use of insulin [facility])    Mei Jack MD    Office Visit    1 year ago Hyperthyroidism    [address] [facility], Lilian Khan MD    Office Visit

## 2023-08-08 ENCOUNTER — TELEPHONE (OUTPATIENT)
Dept: FAMILY MEDICINE CLINIC | Facility: CLINIC | Age: 48
End: 2023-08-08

## 2023-08-08 ENCOUNTER — OFFICE VISIT (OUTPATIENT)
Dept: FAMILY MEDICINE CLINIC | Facility: CLINIC | Age: 48
End: 2023-08-08

## 2023-08-08 VITALS
DIASTOLIC BLOOD PRESSURE: 83 MMHG | WEIGHT: 187 LBS | BODY MASS INDEX: 28 KG/M2 | HEART RATE: 88 BPM | SYSTOLIC BLOOD PRESSURE: 124 MMHG

## 2023-08-08 DIAGNOSIS — F43.21 GRIEF REACTION: ICD-10-CM

## 2023-08-08 DIAGNOSIS — E11.9 TYPE 2 DIABETES MELLITUS WITHOUT COMPLICATION, WITHOUT LONG-TERM CURRENT USE OF INSULIN (HCC): Primary | ICD-10-CM

## 2023-08-08 DIAGNOSIS — Z12.11 COLON CANCER SCREENING: ICD-10-CM

## 2023-08-08 DIAGNOSIS — R07.89 ATYPICAL CHEST PAIN: ICD-10-CM

## 2023-08-08 PROCEDURE — 3079F DIAST BP 80-89 MM HG: CPT | Performed by: FAMILY MEDICINE

## 2023-08-08 PROCEDURE — 99215 OFFICE O/P EST HI 40 MIN: CPT | Performed by: FAMILY MEDICINE

## 2023-08-08 PROCEDURE — 3074F SYST BP LT 130 MM HG: CPT | Performed by: FAMILY MEDICINE

## 2023-08-08 RX ORDER — INSULIN GLARGINE 100 [IU]/ML
20 INJECTION, SOLUTION SUBCUTANEOUS NIGHTLY
Qty: 6 EACH | Refills: 11 | Status: SHIPPED | OUTPATIENT
Start: 2023-08-08

## 2023-08-08 NOTE — TELEPHONE ENCOUNTER
Patient calling to request a revision to her return to work note, she would like it to include \"allow breaks as needed\" still to return to work 8/14/2023  Please fax to her home at fax# [phone]

## 2023-08-08 NOTE — TELEPHONE ENCOUNTER
Dr. Karla Dietrich,   See pending letter, she wanted us to add allow breaks as needed in letter. Please sign.  Thanks

## 2023-08-09 NOTE — TELEPHONE ENCOUNTER
Patient stated has not received the note but stated is jerald to see mychart but needs it faxed and the patient best fax number is below needs this notes   [phone]

## 2023-08-16 RX ORDER — PROPRANOLOL HYDROCHLORIDE 10 MG/1
10 TABLET ORAL DAILY
Qty: 90 TABLET | Refills: 1 | Status: SHIPPED | OUTPATIENT
Start: 2023-08-16

## 2023-08-16 NOTE — TELEPHONE ENCOUNTER
Please review. Protocol failed / No protocol. Requested Prescriptions   Pending Prescriptions Disp Refills    PROPRANOLOL 10 MG Oral Tab [Pharmacy Med Name: PROPRANOLOL 10MG TABLETS] 90 tablet 1     Sig: TAKE 1 TABLET(10 MG) BY MOUTH DAILY       Hypertensive Medications Protocol Failed - 8/15/2023 12:49 PM        Failed - CMP or BMP in past 6 months     No results found for this or any previous visit (from the past 4392 hour(s)).             Passed - In person appointment in the past 12 or next 3 months     Recent Outpatient Visits              1 week ago Type 2 diabetes mellitus without complication, without long-term current use of insulin [facility]    [facility], [address], [address] Manolo Farmer MD    Office Visit    2 weeks ago Type 2 diabetes mellitus without complication, without long-term current use of insulin [facility])    [address], [address], [address] Manolo Farmer MD    Office Visit    2 months ago Post-operative state    [address], [address], Yonathan Olson MD    Office Visit    2 months ago Epidermoid cyst    [address], [address] Chloe Trujillo MD    Office Visit    6 months ago Type 2 diabetes mellitus without complication, without long-term current use of insulin [facility])    [address], [address], [address] Manolo Farmer MD    Office Visit          Future Appointments         Provider Department Appt Notes    In 4 weeks Manolo Farmer MD [address], [address] follow up               Passed - Last BP reading less than 140/90     BP Readings from Last 1 Encounters:  08/08/23 : 124/83              Passed - In person appointment or virtual visit in the past 6 months     Recent Outpatient Visits              1 week ago Type 2 diabetes mellitus without complication, without long-term current use of insulin ([facility]    Sammie Mauro [facility], [address] [place] [address], [address] Gigi Valencia MD    Office Visit    2 weeks ago Type 2 diabetes mellitus without complication, without long-term current use of insulin [facility])    [address], [address], Shi Duron MD    Office Visit    2 months ago Post-operative state    [address] [facility], Maggie Dee MD    Office Visit    2 months ago Epidermoid cyst    Rody Duong MD    Office Visit    6 months ago Type 2 diabetes mellitus without complication, without long-term current use of insulin [facility])    [address], [address], [facility] Gigi Valencia MD    Office Visit          Future Appointments         Provider Department Appt Notes    In 4 weeks Gigi Valencia MD [address], [address], [facility] follow up               [address] [facility] or GFRNAA > 50     GFR Evaluation  EGFRCR: 92 , resulted on 2/4/2023                Recent Outpatient Visits              1 week ago Type 2 diabetes mellitus without complication, without long-term current use of insulin [facility])    [address], [address], [facility] Gigi Valencia MD    Office Visit    2 weeks ago Type 2 diabetes mellitus without complication, without long-term current use of insulin [facility])    [address], Rangel Jimenez MD    Office Visit    2 months ago Post-operative state    [address], Maynor Nguyen MD    Office Visit    2 months ago Epidermoid cyst    [address], Maynor Nguyen MD    Office Visit    6 months ago Type 2 diabetes mellitus without complication, without long-term current use of insulin [facility])    [address], [address], Shi Duron MD    Office Visit             Future Appointments Provider Department Appt Notes    In 4 weeks MD Yoav Zazueta, [facility] follow up

## 2023-09-13 ENCOUNTER — OFFICE VISIT (OUTPATIENT)
Dept: FAMILY MEDICINE CLINIC | Facility: CLINIC | Age: 48
End: 2023-09-13

## 2023-09-13 VITALS
BODY MASS INDEX: 28 KG/M2 | WEIGHT: 182 LBS | SYSTOLIC BLOOD PRESSURE: 107 MMHG | HEART RATE: 82 BPM | DIASTOLIC BLOOD PRESSURE: 75 MMHG

## 2023-09-13 DIAGNOSIS — E11.9 TYPE 2 DIABETES MELLITUS WITHOUT COMPLICATION, WITHOUT LONG-TERM CURRENT USE OF INSULIN (HCC): Primary | ICD-10-CM

## 2023-09-13 PROCEDURE — 3074F SYST BP LT 130 MM HG: CPT | Performed by: FAMILY MEDICINE

## 2023-09-13 PROCEDURE — 3078F DIAST BP <80 MM HG: CPT | Performed by: FAMILY MEDICINE

## 2023-09-13 PROCEDURE — 90471 IMMUNIZATION ADMIN: CPT | Performed by: FAMILY MEDICINE

## 2023-09-13 PROCEDURE — 90686 IIV4 VACC NO PRSV 0.5 ML IM: CPT | Performed by: FAMILY MEDICINE

## 2023-09-13 PROCEDURE — 99214 OFFICE O/P EST MOD 30 MIN: CPT | Performed by: FAMILY MEDICINE

## 2023-09-18 ENCOUNTER — TELEPHONE (OUTPATIENT)
Dept: FAMILY MEDICINE CLINIC | Facility: CLINIC | Age: 48
End: 2023-09-18

## 2023-09-18 RX ORDER — INSULIN GLARGINE 100 [IU]/ML
20 INJECTION, SOLUTION SUBCUTANEOUS NIGHTLY
Qty: 6 EACH | Refills: 11 | Status: SHIPPED | OUTPATIENT
Start: 2023-09-18

## 2023-11-20 RX ORDER — PROPRANOLOL HYDROCHLORIDE 10 MG/1
10 TABLET ORAL DAILY
Qty: 90 TABLET | Refills: 1 | OUTPATIENT
Start: 2023-11-20

## 2023-12-01 RX ORDER — LOSARTAN POTASSIUM 50 MG/1
50 TABLET ORAL DAILY
Qty: 90 TABLET | Refills: 3 | OUTPATIENT
Start: 2023-12-01

## 2024-01-17 RX ORDER — METHIMAZOLE 5 MG/1
5 TABLET ORAL DAILY
Qty: 90 TABLET | Refills: 0 | Status: SHIPPED | OUTPATIENT
Start: 2024-01-17

## 2024-01-17 NOTE — TELEPHONE ENCOUNTER
Please review; protocol failed/ No protocol   No future labs noted       Requested Prescriptions   Pending Prescriptions Disp Refills    METHIMAZOLE 5 MG Oral Tab [Pharmacy Med Name: METHIMAZOLE 5MG TABLETS] 90 tablet 0     Sig: TAKE 1 TABLET(5 MG) BY MOUTH DAILY       Hyperthyroid Medication Protocol Failed - 1/16/2024 12:09 PM        Failed - TSH resulted in past 6 months        Passed - In person appointment or virtual visit in the past 6 mos or appointment in next 3 mos     Recent Outpatient Visits              4 months ago Type 2 diabetes mellitus without complication, without long-term current use of insulin ([facility])    [facility] Katherine D, MD    Office Visit    5 months ago Type 2 diabetes mellitus without complication, without long-term current use of insulin ([facility])    [facility] [place] Katherine D, [place]    Office Visit    5 months ago Type 2 diabetes mellitus without complication, without long-term current use of insulin ([facility])    [facility] Katherine D, MD    Office Visit    7 months ago Post-operative state    [facility] Gerald, MD    Office Visit    7 months ago Epidermoid cyst    [facility] Gerald, MD    Office Visit                           Recent Outpatient Visits              4 months ago Type 2 diabetes mellitus without complication, without long-term current use of insulin ([facility])    [facility] [place] Katherine D, MD    Office Visit    5 months ago Type 2 diabetes mellitus without complication, without long-term current use of insulin ([facility])    [facility] [place] Katherine D, MD    Office Visit    5 months ago Type 2 diabetes mellitus without complication, without long-term current use of  insulin ([facility])    [facility], [place] Gam[place] Katherine D, MD    Office Visit    7 months ago [facility]-operative state    [facility]    Office Visit    7 months ago Epidermoid cyst    [facility], [place] Gerald, MD    Office Visit

## 2024-02-20 ENCOUNTER — PATIENT MESSAGE (OUTPATIENT)
Dept: FAMILY MEDICINE CLINIC | Facility: CLINIC | Age: 49
End: 2024-02-20

## 2024-02-20 RX ORDER — PROPRANOLOL HYDROCHLORIDE 10 MG/1
10 TABLET ORAL DAILY
Qty: 90 TABLET | Refills: 1 | Status: SHIPPED | OUTPATIENT
Start: 2024-02-20

## 2024-02-20 NOTE — TELEPHONE ENCOUNTER
Please review; protocol failed/No Protocol    Outside labs   Component  Ref Range & Units 7/26/23  6:55 AM   GLUCOSE  70 - 105 mg/dL 129 High    SODIUM  135 - 147 meq/L 139   POTASSIUM  3.4 - 5.3 meq/L 3.6   CHLORIDE  96 - 108 meq/L 108   CO2  22 - 32 meq/L 24   ANION GAP  0 - 16 meq/L 7   BUN  7 - 23 mg/dL 10   CREATININE  0.4 - 1.3 mg/dL 0.7   CALCIUM  8.6 - 10.5 mg/dL 8.7   GFR  60 - 9999 >60   Comment: Reference range for eGFR:  >60 mL/min/1.73m(2)    Estimated Glomerular Filtration Rate (eGFR) is calculated  using the 2021 CKD-EPI creatinine equation.  GFR reportable units  are ml/min/1.73m(2).    Note: eGFR results will not be calculated for patients   <18 years old.   BUN/CREAT RATIO 14   HEMOLYSIS- 0   Resulting Agency [facility]     Specimen Collected: 07/26/23  6:55 AM Last Resulted: 07/26/23  7:45 AM   Received From: [facility]  Result Received: 08/02/23  9:17 AM     Requested Prescriptions   Pending Prescriptions Disp Refills    propranolol 10 MG Oral Tab 90 tablet 1     Sig: Take 1 tablet (10 mg total) by mouth daily.       Hypertension Medications Protocol Failed - 2/18/2024  2:23 PM        Failed - CMP or BMP in past 12 months        Failed - EGFRCR or GFRNAA > 50     GFR Evaluation            Passed - Last BP reading less than 140/90     BP Readings from Last 1 Encounters:   09/13/23 107/75               Passed - In person appointment or virtual visit in the past 12 mos or appointment in next 3 mos     Recent Outpatient Visits              5 months ago Type 2 diabetes mellitus without complication, without long-term current use of insulin ([facility]    Office Visit    6 months ago Type 2 diabetes mellitus without complication, without long-term current use of insulin ([facility])    [facility]    Office Visit    6 months  ago Type 2 diabetes mellitus without complication, without long-term current use of insulin ([facility])    [facility] [place] Katherine D, MD    Office Visit    8 months ago Post-operative state    [facility], [place] [address]ble Gerald, MD    Office Visit    8 months ago Epidermoid cyst    [facility], [place] Gerald, MD    Office Visit                           Recent Outpatient Visits              5 months ago Type 2 diabetes mellitus without complication, without long-term current use of insulin ([facility])    [facility] [place] Katherine D, MD    Office Visit    6 months ago Type 2 diabetes mellitus without complication, without long-term current use of insulin ([facility])    [facility] [place] Katherine D, MD    Office Visit    6 months ago Type 2 diabetes mellitus without complication, without long-term current use of insulin ([facility])    [facility] [place] Katherine D, MD    Office Visit    8 months ago Post-operative state    [facility] [place] Gerald, MD    Office Visit    8 months ago Epidermoid cyst    [facility] [place] Gerald, MD    Office Visit

## 2024-03-05 ENCOUNTER — TELEPHONE (OUTPATIENT)
Dept: FAMILY MEDICINE CLINIC | Facility: CLINIC | Age: 49
End: 2024-03-05

## 2024-03-05 RX ORDER — INSULIN GLARGINE 100 [IU]/ML
20 INJECTION, SOLUTION SUBCUTANEOUS NIGHTLY
Qty: 1 EACH | Refills: 0 | Status: SHIPPED | OUTPATIENT
Start: 2024-03-05

## 2024-03-05 NOTE — TELEPHONE ENCOUNTER
Emely calling again from [facility] pharmacy  Patient is at the pharmacy   very anxious to get Insulin   See note below concerning use of Lantus       Routing to POD mate as Dr Brantley is out of office until 3/12      Please advise and thank you.

## 2024-03-05 NOTE — TELEPHONE ENCOUNTER
That's fine. Script to pharmacy.     Patient overdue for follow up appointment with PCP. Will need appointment for additional refills. Continue to check blood sugar at minimum daily. - ALISHA Rea

## 2024-03-05 NOTE — TELEPHONE ENCOUNTER
Emely from [facility] pharmacy indicated that Basaglar pen is no longer covered. Not on patient's formulary. States that Lantus should be covered. Ok to dispense lantus pen instead?

## 2024-03-05 NOTE — TELEPHONE ENCOUNTER
Advised patient of Esther's note. Patient verbalized understanding.     She will call back to schedule physical when she has her work schedule in front of her.

## 2024-03-06 RX ORDER — LOSARTAN POTASSIUM 50 MG/1
50 TABLET ORAL DAILY
Qty: 90 TABLET | Refills: 3 | OUTPATIENT
Start: 2024-03-06

## 2024-03-06 RX ORDER — LOSARTAN POTASSIUM 50 MG/1
50 TABLET ORAL DAILY
Qty: 90 TABLET | Refills: 0 | Status: SHIPPED | OUTPATIENT
Start: 2024-03-06

## 2024-03-06 NOTE — TELEPHONE ENCOUNTER
Please review.  Protocol failed / Has no protocol.    Future Appointments   Date Time Provider Department Center   3/13/2024  9:30 AM Nani Johnson LCSW [facility] [place]   4/10/2024 10:40 AM Aidee Brantley MD [facility]     No Active/ Future labs pended     Requested Prescriptions   Pending Prescriptions Disp Refills    LOSARTAN 50 MG Oral Tab [Pharmacy Med Name: LOSARTAN 50MG TABLETS] 90 tablet 3     Sig: TAKE 1 TABLET(50 MG) BY MOUTH DAILY       Hypertension Medications Protocol Failed - 3/4/2024  4:29 PM        Failed - CMP or BMP in past 12 months        Failed - EGFRCR or GFRNAA > 50     GFR Evaluation            Passed - Last BP reading less than 140/90     BP Readings from Last 1 Encounters:   09/13/23 107/75               Passed - In person appointment or virtual visit in the past 12 mos or appointment in next 3 mos     Recent Outpatient Visits              5 months ago Type 2 diabetes mellitus without complication, without long-term current use of insulin ([facility])    [facility] Aidee Brantley MD    Office Visit    7 months ago Type 2 diabetes mellitus without complication, without long-term current use of insulin ([facility])    [facility] Aidee Brantley MD    Office Visit    7 months ago Type 2 diabetes mellitus without complication, without long-term current use of insulin ([facility])    [facility] Aidee Brantley MD    Office Visit    8 months ago Post-operative state    [facility] Ammon Brantley MD    Office Visit    9 months ago Epidermoid cyst    [facility] Ammon Brantley MD    Office Visit          Future Appointments         Provider Department Appt Notes    In 1 month Aidee Brantley MD [facility] Diabetes

## 2024-03-06 NOTE — TELEPHONE ENCOUNTER
RX not on Med list   Refill passed per [facility] protocol.      Requested Prescriptions   Pending Prescriptions Disp Refills    BD PEN NEEDLE SHORT U/F 31G X 8 MM Does not apply Misc [Pharmacy Med Name: BD UF SHORT PEN NEEDL 31GX5]  0     Sig: USE AS DIRECTED       Diabetic Supplies Protocol Passed - 3/5/2024 12:40 PM        Passed - In person appointment or virtual visit in the past 12 mos or appointment in next 3 mos     Recent Outpatient Visits              5 months ago Type 2 diabetes mellitus without complication, without long-term current use of insulin ([facility])    [facility] Aidee Brantley MD    Office Visit    7 months ago Type 2 diabetes mellitus without complication, without long-term current use of insulin ([facility])    [facility] Aidee Brantley [facility]    Office Visit    7 months ago Type 2 diabetes mellitus without complication, without long-term current use of insulin ([facility])    [facility] Aidee Brantley MD    Office Visit    8 months ago Post-operative state    [facility] Ammon Brantley MD    Office Visit    9 months ago Epidermoid cyst    [facility] Ammon Brantley MD    Office Visit          Future Appointments         Provider Department Appt Notes    In 1 month Aidee Brantley MD [facility] Diabetes                     Future Appointments         Provider Department Appt Notes    In 1 month Aidee Brantley MD [facility] Diabetes            Recent Outpatient Visits              5 months ago Type 2 diabetes mellitus without complication, without long-term current use of insulin ([facility])    [facility] Aidee Brantley MD    Office Visit    7 months ago Type  2 diabetes mellitus without complication, without long-term current use of insulin ([facility])    [place] Katherine D, MD    Office Visit    7 months ago Type 2 diabetes mellitus without complication, without long-term current use of insulin ([facility])    [facility] [place] [place] [place]    Office Visit    8 months ago Post-operative state    [facility] [place] [facility] [place] [place]    Office Visit    9 months ago Epidermoid cyst    [place] [facility] [place] [place] [place]    Office Visit

## 2024-03-06 NOTE — TELEPHONE ENCOUNTER
Refill passed per [facility] protocol.      Requested Prescriptions   Pending Prescriptions Disp Refills    BD PEN NEEDLE SHORT U/F 31G X 8 MM Does not apply Misc [Pharmacy Med Name: BD UF SHORT PEN NEEDL 31GX5]  0     Sig: USE AS DIRECTED       Diabetic Supplies Protocol Passed - 3/5/2024 12:40 PM        Passed - In person appointment or virtual visit in the past 12 mos or appointment in next 3 mos     Recent Outpatient Visits              5 months ago Type 2 diabetes mellitus without complication, without long-term current use of insulin ([facility])    [facility] Aidee Brantley MD    Office Visit    7 months ago Type 2 diabetes mellitus without complication, without long-term current use of insulin ([facility])    [facility] Aidee Brantley MD    Office Visit    7 months ago Type 2 diabetes mellitus without complication, without long-term current use of insulin ([facility])    [facility] Aidee Brantley MD    Office Visit    8 months ago Post-operative state    [facility] Ammon Brantley MD    Office Visit    9 months ago Epidermoid cyst    [facility] Ammon Brantley MD    Office Visit          Future Appointments         Provider Department Appt Notes    In 1 month Aidee Brantley MD [facility] Diabetes                     Future Appointments         Provider Department Appt Notes    In 1 month Aidee Brantley MD [facility] Diabetes            Recent Outpatient Visits              5 months ago Type 2 diabetes mellitus without complication, without long-term current use of insulin ([facility])    [facility] Aidee Brantley MD    Office Visit    7 months ago Type 2 diabetes mellitus  without complication, without long-term current use of insulin ([facility])    [facility]    Office Visit    7 months ago Type 2 diabetes mellitus without complication, without long-term current use of insulin ([facility])    [facility]    Office Visit    8 months ago Post-operative state    [facility]    Office Visit    9 months ago Epidermoid cyst    [facility]    Office Visit

## 2024-03-07 RX ORDER — PEN NEEDLE, DIABETIC 31 GX5/16"
1 NEEDLE, DISPOSABLE MISCELLANEOUS AS DIRECTED
Qty: 100 EACH | Refills: 3 | Status: SHIPPED | OUTPATIENT
Start: 2024-03-07

## 2024-03-08 ENCOUNTER — TELEPHONE (OUTPATIENT)
Dept: FAMILY MEDICINE CLINIC | Facility: CLINIC | Age: 49
End: 2024-03-08

## 2024-03-08 RX ORDER — INSULIN GLARGINE-YFGN 100 [IU]/ML
20 INJECTION, SOLUTION SUBCUTANEOUS NIGHTLY
Qty: 18 ML | Refills: 0 | OUTPATIENT
Start: 2024-03-08

## 2024-03-08 NOTE — TELEPHONE ENCOUNTER
Spoke to patient, we do not have any MMR vaccine records in her file. Patient states she had MMR titer done at [facility] today to see if she has immunity. Will follow up in regards to if she needs the vaccine or not.

## 2024-03-15 ENCOUNTER — TELEPHONE (OUTPATIENT)
Dept: FAMILY MEDICINE CLINIC | Facility: CLINIC | Age: 49
End: 2024-03-15

## 2024-03-15 NOTE — TELEPHONE ENCOUNTER
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

## 2024-03-21 ENCOUNTER — OFFICE VISIT (OUTPATIENT)
Dept: FAMILY MEDICINE CLINIC | Facility: CLINIC | Age: 49
End: 2024-03-21

## 2024-03-21 VITALS
BODY MASS INDEX: 28 KG/M2 | HEART RATE: 96 BPM | SYSTOLIC BLOOD PRESSURE: 121 MMHG | DIASTOLIC BLOOD PRESSURE: 81 MMHG | WEIGHT: 186 LBS

## 2024-03-21 DIAGNOSIS — E11.9 TYPE 2 DIABETES MELLITUS WITHOUT COMPLICATION, WITHOUT LONG-TERM CURRENT USE OF INSULIN (HCC): Primary | ICD-10-CM

## 2024-03-21 LAB
ALBUMIN SERPL-MCNC: 4.3 G/DL (ref 3.2–4.8)
ALBUMIN/GLOB SERPL: 1.4 {RATIO} (ref 1–2)
ALP LIVER SERPL-CCNC: 66 U/L
ALT SERPL-CCNC: 14 U/L
ANION GAP SERPL CALC-SCNC: 6 MMOL/L (ref 0–18)
AST SERPL-CCNC: 17 U/L (ref ?–34)
BASOPHILS # BLD AUTO: 0.08 X10(3) UL (ref 0–0.2)
BASOPHILS NFR BLD AUTO: 0.9 %
BILIRUB SERPL-MCNC: 0.3 MG/DL (ref 0.3–1.2)
BUN BLD-MCNC: 10 MG/DL (ref 9–23)
BUN/CREAT SERPL: 13.9 (ref 10–20)
CALCIUM BLD-MCNC: 9.3 MG/DL (ref 8.7–10.4)
CHLORIDE SERPL-SCNC: 107 MMOL/L (ref 98–112)
CHOLEST SERPL-MCNC: 105 MG/DL (ref ?–200)
CO2 SERPL-SCNC: 24 MMOL/L (ref 21–32)
CREAT BLD-MCNC: 0.72 MG/DL
CREAT UR-SCNC: 124 MG/DL
DEPRECATED RDW RBC AUTO: 43.3 FL (ref 35.1–46.3)
EGFRCR SERPLBLD CKD-EPI 2021: 104 ML/MIN/1.73M2 (ref 60–?)
EOSINOPHIL # BLD AUTO: 0.29 X10(3) UL (ref 0–0.7)
EOSINOPHIL NFR BLD AUTO: 3.1 %
ERYTHROCYTE [DISTWIDTH] IN BLOOD BY AUTOMATED COUNT: 15.8 % (ref 11–15)
FASTING PATIENT LIPID ANSWER: NO
FASTING STATUS PATIENT QL REPORTED: NO
GLOBULIN PLAS-MCNC: 3.1 G/DL (ref 2.8–4.4)
GLUCOSE BLD-MCNC: 183 MG/DL (ref 70–99)
HCT VFR BLD AUTO: 35.4 %
HDLC SERPL-MCNC: 38 MG/DL (ref 40–59)
HEMOGLOBIN A1C: 9.4 % (ref 4.3–5.6)
HGB BLD-MCNC: 11.4 G/DL
IMM GRANULOCYTES # BLD AUTO: 0.02 X10(3) UL (ref 0–1)
IMM GRANULOCYTES NFR BLD: 0.2 %
LDLC SERPL CALC-MCNC: 50 MG/DL (ref ?–100)
LYMPHOCYTES # BLD AUTO: 3.38 X10(3) UL (ref 1–4)
LYMPHOCYTES NFR BLD AUTO: 36.6 %
MCH RBC QN AUTO: 24.2 PG (ref 26–34)
MCHC RBC AUTO-ENTMCNC: 32.2 G/DL (ref 31–37)
MCV RBC AUTO: 75 FL
MICROALBUMIN UR-MCNC: 0.6 MG/DL
MICROALBUMIN/CREAT 24H UR-RTO: 4.8 UG/MG (ref ?–30)
MONOCYTES # BLD AUTO: 0.53 X10(3) UL (ref 0.1–1)
MONOCYTES NFR BLD AUTO: 5.7 %
NEUTROPHILS # BLD AUTO: 4.93 X10 (3) UL (ref 1.5–7.7)
NEUTROPHILS # BLD AUTO: 4.93 X10(3) UL (ref 1.5–7.7)
NEUTROPHILS NFR BLD AUTO: 53.5 %
NONHDLC SERPL-MCNC: 67 MG/DL (ref ?–130)
OSMOLALITY SERPL CALC.SUM OF ELEC: 288 MOSM/KG (ref 275–295)
PLATELET # BLD AUTO: 366 10(3)UL (ref 150–450)
POTASSIUM SERPL-SCNC: 4 MMOL/L (ref 3.5–5.1)
PROT SERPL-MCNC: 7.4 G/DL (ref 5.7–8.2)
RBC # BLD AUTO: 4.72 X10(6)UL
SODIUM SERPL-SCNC: 137 MMOL/L (ref 136–145)
TRIGL SERPL-MCNC: 82 MG/DL (ref 30–149)
VLDLC SERPL CALC-MCNC: 12 MG/DL (ref 0–30)
WBC # BLD AUTO: 9.2 X10(3) UL (ref 4–11)

## 2024-03-21 PROCEDURE — 83036 HEMOGLOBIN GLYCOSYLATED A1C: CPT | Performed by: FAMILY MEDICINE

## 2024-03-21 PROCEDURE — 99214 OFFICE O/P EST MOD 30 MIN: CPT | Performed by: FAMILY MEDICINE

## 2024-03-21 RX ORDER — SERTRALINE HYDROCHLORIDE 100 MG/1
50 TABLET, FILM COATED ORAL DAILY
Qty: 45 TABLET | Refills: 3 | Status: SHIPPED | OUTPATIENT
Start: 2024-03-21

## 2024-03-21 RX ORDER — INSULIN GLARGINE-YFGN 100 [IU]/ML
INJECTION, SOLUTION SUBCUTANEOUS
COMMUNITY
Start: 2024-03-08

## 2024-03-27 NOTE — PROGRESS NOTES
Laureen Hill is a 48 year old female.  Chief Complaint   Patient presents with    Diabetes     F/u    Anxiety     Would like to start medicatio       HPI:   Patient is a 48-year-old female with uncontrolled diabetes hemoglobin A1c of 9.4.  Patient also has anxiety would like to start medication.    Current Outpatient Medications   Medication Sig Dispense Refill    Insulin Glargine-yfgn 100 UNIT/ML Subcutaneous Solution Pen-injector       sertraline 100 MG Oral Tab Take 0.5 tablets (50 mg total) by mouth daily. 45 tablet 3    Insulin Pen Needle (BD PEN NEEDLE SHORT U/F) 31G X 8 MM Does not apply Misc Take 1 Bottle by mouth As Directed. 100 each 3    losartan 50 MG Oral Tab Take 1 tablet (50 mg total) by mouth daily. 90 tablet 0    insulin glargine 100 UNIT/ML Subcutaneous Solution Inject 20 Units into the skin nightly. Needs appointment for additional refills. 1 each 0    propranolol 10 MG Oral Tab Take 1 tablet (10 mg total) by mouth daily. 90 tablet 1    propranolol 10 MG Oral Tab Take 1 tablet (10 mg total) by mouth daily. 90 tablet 1    methIMAzole 5 MG Oral Tab Take 1 tablet (5 mg total) by mouth daily. 90 tablet 0    metFORMIN HCl 1000 MG Oral Tab Take 1 tablet (1,000 mg total) by mouth 2 (two) times daily. 180 tablet 3    atorvastatin 40 MG Oral Tab Take 1 tablet (40 mg total) by mouth nightly. 90 tablet 3      Past Medical History:   Diagnosis Date    Diabetes (HCC)     Hyperthyroidism       Past Surgical History:   Procedure Laterality Date    D & c        Social History:  Social History     Socioeconomic History    Marital status: Legally    Tobacco Use    Smoking status: Never    Smokeless tobacco: Never   Vaping Use    Vaping Use: Never used   Substance and Sexual Activity    Alcohol use: Never    Drug use: Never        REVIEW OF SYSTEMS:   GENERAL HEALTH: No fevers, chills, sweats, fatigue  VISION: No recent vision problems, blurry vision or double vision  HEENT: No decreased hearing ear pain  nasal congestion or sore throat  SKIN: denies any unusual skin lesions or rashes  RESPIRATORY: denies shortness of breath, cough, wheezing  CARDIOVASCULAR: denies chest pain on exertion, palpitations, swelling in feet  GI: denies abdominal pain and denies heartburn, nausea or vomiting  : No Pain on urination, change in the color of urine, discharge, urinating frequently  MUS: No back pain, joint pain, muscle pain  NEURO: denies headaches , anxiety, depression    EXAM:   /81 (BP Location: Left arm, Patient Position: Sitting, Cuff Size: adult)   Pulse 96   Wt 186 lb (84.4 kg)   LMP 08/01/2023   BMI 28.28 kg/m²   GENERAL: well developed, well nourished,in no apparent distress        ASSESSMENT AND PLAN:   1. Type 2 diabetes mellitus without complication, without long-term current use of insulin ([facility])  Hemoglobin A1c 9.4.  Made an appointment for patient to see endocrine.  Labs and microalbumin today.  Patient due for physical..  We will start sertraline 100 mg today.  Patient to return in 4 to 6 weeks.  Patient needs a full physical at that time.- POC Glycohemoglobin [19800]  - CBC With Differential With Platelet  - Comp Metabolic Panel (14)  - Lipid Panel  - Microalb/Creat Ratio, Random Urine; Future  - Endocrine Referral - [facility]) [address]  - Microalb/Creat Ratio, Random Urine     I spent 35 minutes with patient both reviewing her signs and symptoms anxiety, discussing her uncontrolled diabetes, making a treatment plan, discussing medications including side effects of SSRIs, and doing documentation.    The patient indicates understanding of these issues and agrees to the plan.  No follow-ups on file.

## 2024-04-22 RX ORDER — METHIMAZOLE 5 MG/1
5 TABLET ORAL DAILY
Qty: 90 TABLET | Refills: 0 | OUTPATIENT
Start: 2024-04-22

## 2024-04-22 RX ORDER — METHIMAZOLE 5 MG/1
5 TABLET ORAL DAILY
Qty: 90 TABLET | Refills: 0 | Status: SHIPPED | OUTPATIENT
Start: 2024-04-22

## 2024-04-22 NOTE — TELEPHONE ENCOUNTER
Please review. Protocol Failed; No Protocol  No Active/ Future labs pended for TSH  Future Appointments   Date Time Provider Department Center   4/24/2024 11:20 AM Aidee Brantley MD [facility]        Requested Prescriptions   Pending Prescriptions Disp Refills    methIMAzole 5 MG Oral Tab 90 tablet 0     Sig: Take 1 tablet (5 mg total) by mouth daily.       Hyperthyroid Medication Protocol Failed - 4/19/2024 12:08 PM        Failed - TSH resulted in past 12 months        Failed - Last TSH within normal limits     Lab Results   Component Value Date    TSH 0.941 02/04/2023                 Passed - In person appointment or virtual visit in the past 12 mos or appointment in next 3 mos     Recent Outpatient Visits              1 month ago Type 2 diabetes mellitus without complication, without long-term current use of insulin ([facility])    [facility] Aidee Brantley MD    Office Visit    7 months ago Type 2 diabetes mellitus without complication, without long-term current use of insulin ([facility])    [facility] Aidee Brantley MD    Office Visit    8 months ago Type 2 diabetes mellitus without complication, without long-term current use of insulin ([facility])    [facility] Aidee Brantley MD    Office Visit    8 months ago Type 2 diabetes mellitus without complication, without long-term current use of insulin ([facility])    [facility] Aidee Brantley MD    Office Visit    10 months ago Post-operative state    [facility] Ammon Brantley MD    Office Visit          Future Appointments         Provider Department Appt Notes    In 2 days Aidee Brantley MD [facility] Diabetes                           Future Appointments         Provider Department Appt Notes    In 2  Aidee Magdaleno MD [facility] Diabetes          Recent Outpatient Visits              1 month ago Type 2 diabetes mellitus without complication, without long-term current use of insulin ([facility])    [facility]    Office Visit    7 months ago Type 2 diabetes mellitus without complication, without long-term current use of insulin ([facility])    [facility]    Office Visit    8 months ago Type 2 diabetes mellitus without complication, without long-term current use of insulin ([facility])    [facility]    Office Visit    8 months ago Type 2 diabetes mellitus without complication, without long-term current use of insulin ([facility])    [facility]    Office Visit    10 months ago Post-operative state    [facility] Ammon Brantley MD    Office Visit

## 2024-04-24 ENCOUNTER — OFFICE VISIT (OUTPATIENT)
Dept: FAMILY MEDICINE CLINIC | Facility: CLINIC | Age: 49
End: 2024-04-24
Payer: COMMERCIAL

## 2024-04-24 VITALS
HEART RATE: 97 BPM | SYSTOLIC BLOOD PRESSURE: 130 MMHG | DIASTOLIC BLOOD PRESSURE: 86 MMHG | WEIGHT: 180 LBS | BODY MASS INDEX: 27 KG/M2

## 2024-04-24 DIAGNOSIS — E11.9 TYPE 2 DIABETES MELLITUS WITHOUT COMPLICATION, WITHOUT LONG-TERM CURRENT USE OF INSULIN (HCC): Primary | ICD-10-CM

## 2024-04-24 DIAGNOSIS — F41.9 ANXIETY: ICD-10-CM

## 2024-04-24 DIAGNOSIS — F43.21 GRIEF REACTION: ICD-10-CM

## 2024-04-24 DIAGNOSIS — Z12.31 ENCOUNTER FOR SCREENING MAMMOGRAM FOR MALIGNANT NEOPLASM OF BREAST: ICD-10-CM

## 2024-04-24 LAB — HEMOGLOBIN A1C: 9.4 % (ref 4.3–5.6)

## 2024-04-24 PROCEDURE — 83036 HEMOGLOBIN GLYCOSYLATED A1C: CPT | Performed by: FAMILY MEDICINE

## 2024-04-24 PROCEDURE — 99214 OFFICE O/P EST MOD 30 MIN: CPT | Performed by: FAMILY MEDICINE

## 2024-04-24 NOTE — PROGRESS NOTES
Laureen Hill is a 48 year old female.  Chief Complaint   Patient presents with    Diabetes     F/u on A1C    Anxiety     F/u sertraline medication                                                                                                                                                                                                                                                                              HPI:   Patient is a 48-year-old female presents today for follow-up anxiety and diabetes.  At last visit her hemoglobin A1c was 9.4 it is also 9.4 today.  She was due to see endocrine last Saturday but had to miss the appointment because her father passed away.  We have started her on sertraline at last visit 100 mg she did not start taking it because she was feeling a lot better and then her father passed.  She was given resources from behavioral health has not made an appointment yet.  She does have the list on her phone.  Patient states she had a Pap smear 1 year ago.    Current Outpatient Medications   Medication Sig Dispense Refill    methIMAzole 5 MG Oral Tab Take 1 tablet (5 mg total) by mouth daily. 90 tablet 0    Insulin Glargine-yfgn 100 UNIT/ML Subcutaneous Solution Pen-injector       sertraline 100 MG Oral Tab Take 0.5 tablets (50 mg total) by mouth daily. 45 tablet 3    Insulin Pen Needle (BD PEN NEEDLE SHORT U/F) 31G X 8 MM Does not apply Misc Take 1 Bottle by mouth As Directed. 100 each 3    losartan 50 MG Oral Tab Take 1 tablet (50 mg total) by mouth daily. 90 tablet 0    insulin glargine 100 UNIT/ML Subcutaneous Solution Inject 20 Units into the skin nightly. Needs appointment for additional refills. 1 each 0    propranolol 10 MG Oral Tab Take 1 tablet (10 mg total) by mouth daily. 90 tablet 1    propranolol 10 MG Oral Tab Take 1 tablet (10 mg total) by mouth daily. 90 tablet 1    metFORMIN HCl 1000 MG Oral Tab Take 1 tablet (1,000 mg total) by mouth 2 (two) times daily. 180 tablet 3     atorvastatin 40 MG Oral Tab Take 1 tablet (40 mg total) by mouth nightly. 90 tablet 3      Past Medical History:    Diabetes (HCC)    Hyperthyroidism      Past Surgical History:   Procedure Laterality Date    D & c        Social History:  Social History     Socioeconomic History    Marital status: Legally    Tobacco Use    Smoking status: Never    Smokeless tobacco: Never   Vaping Use    Vaping status: Never Used   Substance and Sexual Activity    Alcohol use: Never    Drug use: Never     Social Determinants of Health     Food Insecurity: No Food Insecurity (7/26/2023)    Received from [facility], [facility]    Hunger Vital Sign     Worried About Running Out of Food in the Last Year: Never true     Ran Out of Food in the Last Year: Never true        REVIEW OF SYSTEMS:   GENERAL HEALTH: No fevers, chills, sweats, fatigue  VISION: No recent vision problems, blurry vision or double vision  HEENT: No decreased hearing ear pain nasal congestion or sore throat  SKIN: denies any unusual skin lesions or rashes  RESPIRATORY: denies shortness of breath, cough, wheezing  CARDIOVASCULAR: denies chest pain on exertion, palpitations, swelling in feet  GI: denies abdominal pain and denies heartburn, nausea or vomiting  : No Pain on urination, change in the color of urine, discharge, urinating frequently  MUS: No back pain, joint pain, muscle pain  NEURO: denies headaches , anxiety, depression    EXAM:   /86 (BP Location: Left arm, Patient Position: Sitting, Cuff Size: adult)   Pulse 97   Wt 180 lb (81.6 kg)   LMP 04/22/2024   BMI 27.37 kg/m²   GENERAL: well developed, well nourished,in no apparent distress        ASSESSMENT AND PLAN:   1. Type 2 diabetes mellitus without complication, without long-term current use of insulin ([facility])  Hemoglobin A1c is 9.4.  She is taking between 25 and 30 units of insulin daily we will continue that we have made another appointment  for her with endocrine.  - POC Glycohemoglobin [72646]    2. Grief reaction  Patient has a list of therapist.  I do not think it would help to start the sertraline at this point and that will be hard to determine if it is therapeutic.  Patient agrees.    3. Anxiety  Patient to make an appointment with a list of therapist given by behavioral health.  I spent 30 minutes with the patient both discussing anxiety, medications, grief, diabetes, making a treatment plan and doing documentation       The patient indicates understanding of these issues and agrees to the plan.  No follow-ups on file.

## 2024-05-18 RX ORDER — ATORVASTATIN CALCIUM 40 MG/1
40 TABLET, FILM COATED ORAL NIGHTLY
Qty: 90 TABLET | Refills: 3 | Status: SHIPPED | OUTPATIENT
Start: 2024-05-18

## 2024-05-18 NOTE — TELEPHONE ENCOUNTER
Refill Passed Per Protocol    Requested Prescriptions   Pending Prescriptions Disp Refills    atorvastatin 40 MG Oral Tab 90 tablet 3     Sig: Take 1 tablet (40 mg total) by mouth nightly.       Cholesterol Medication Protocol Passed - 5/16/2024  9:51 AM        Passed - ALT < 80     Lab Results   Component Value Date    ALT 14 03/21/2024             Passed - ALT resulted within past year        Passed - Lipid panel within past 12 months     Lab Results   Component Value Date    CHOLEST 105 03/21/2024    TRIG 82 03/21/2024    HDL 38 (L) 03/21/2024    LDL 50 03/21/2024    VLDL 12 03/21/2024    NONHDLC 67 03/21/2024             Passed - In person appointment or virtual visit in the past 12 mos or appointment in next 3 mos     Recent Outpatient Visits              3 weeks ago Type 2 diabetes mellitus without complication, without long-term current use of insulin ([facility])    [facility] Aidee Brantley MD    Office Visit    1 month ago Type 2 diabetes mellitus without complication, without long-term current use of insulin ([facility])    [facility] Aidee Brantley MD    Office Visit    8 months ago Type 2 diabetes mellitus without complication, without long-term current use of insulin ([facility])    [facility] Aidee Campbell MD    Office Visit    9 months ago Type 2 diabetes mellitus without complication, without long-term current use of insulin ([facility])    [facility] Aidee Brantley MD    Office Visit    9 months ago Type 2 diabetes mellitus without complication, without long-term current use of insulin ([facility])    [facility] Aidee Campbell MD    Office Visit          Future Appointments         Provider Department Appt Notes    In 2 weeks Mela Jesus APRN [facility]  [place] consult for diabetes    In 3 weeks [address]                          Future Appointments         Provider Department Appt Notes    In 2 weeks [facility]    In 3 weeks [address]           Recent Outpatient Visits              3 weeks ago Type 2 diabetes mellitus without complication, without long-term current use of insulin ([facility])    [facility]    Office Visit    1 month ago Type 2 diabetes mellitus without complication, without long-term current use of insulin ([facility])    [facility]    Office Visit    8 months ago Type 2 diabetes mellitus without complication, without long-term current use of insulin ([facility])    [facility] Aidee Campbell MD    Office Visit    9 months ago Type 2 diabetes mellitus without complication, without long-term current use of insulin ([facility])    [facility] Aidee Campbell MD    Office Visit    9 months ago Type 2 diabetes mellitus without complication, without long-term current use of insulin ([facility])    [facility] Aidee Campbell MD    Office Visit

## 2024-06-03 ENCOUNTER — TELEPHONE (OUTPATIENT)
Dept: ENDOCRINOLOGY CLINIC | Facility: CLINIC | Age: 49
End: 2024-06-03

## 2024-06-03 ENCOUNTER — OFFICE VISIT (OUTPATIENT)
Dept: ENDOCRINOLOGY CLINIC | Facility: CLINIC | Age: 49
End: 2024-06-03
Payer: COMMERCIAL

## 2024-06-03 VITALS
DIASTOLIC BLOOD PRESSURE: 82 MMHG | SYSTOLIC BLOOD PRESSURE: 116 MMHG | WEIGHT: 180 LBS | HEART RATE: 86 BPM | BODY MASS INDEX: 27 KG/M2

## 2024-06-03 DIAGNOSIS — E11.9 TYPE 2 DIABETES MELLITUS WITHOUT COMPLICATION, WITHOUT LONG-TERM CURRENT USE OF INSULIN (HCC): Primary | ICD-10-CM

## 2024-06-03 LAB
GLUCOSE BLOOD: 218
TEST STRIP LOT #: NORMAL NUMERIC

## 2024-06-03 RX ORDER — DAPAGLIFLOZIN 10 MG/1
10 TABLET, FILM COATED ORAL DAILY
Qty: 90 TABLET | Refills: 1 | Status: SHIPPED | OUTPATIENT
Start: 2024-06-03 | End: 2024-06-06

## 2024-06-03 RX ORDER — BLOOD-GLUCOSE METER
1 EACH MISCELLANEOUS DAILY
Qty: 1 KIT | Refills: 0 | Status: SHIPPED | OUTPATIENT
Start: 2024-06-03

## 2024-06-03 RX ORDER — LANCETS 33 GAUGE
EACH MISCELLANEOUS
Qty: 200 EACH | Refills: 1 | Status: SHIPPED | OUTPATIENT
Start: 2024-06-03

## 2024-06-03 RX ORDER — BLOOD SUGAR DIAGNOSTIC
STRIP MISCELLANEOUS
Qty: 200 STRIP | Refills: 1 | Status: SHIPPED | OUTPATIENT
Start: 2024-06-03

## 2024-06-03 RX ORDER — LOSARTAN POTASSIUM 50 MG/1
50 TABLET ORAL DAILY
Qty: 90 TABLET | Refills: 1 | Status: SHIPPED | OUTPATIENT
Start: 2024-06-03

## 2024-06-03 NOTE — TELEPHONE ENCOUNTER
Kayleigh is requesting a call back from REGISTERED NURSE to obtain ADVANCED PRACTITIONER REGISTERED NURSE's RAHUL # to process Farxiga prescription.  Please call.  Thank you.

## 2024-06-03 NOTE — PROGRESS NOTES
Name: Laureen Hill  Date: 6/3/2024    Referring Physician: No ref. provider found    HISTORY OF PRESENT ILLNESS   Laureen Hill is a 48 year old female who presents for consult for diabetes mellitus     She had seen Dr. Sanchez in the past but has now been lost to follow up since 2021.   She admits to significant stress in the last year. She lost both her  and her father in the last 12 months. Admits has not been adherent with low CHO diet or exercise recently.     Diabetes History:  Diagnosed- diagnosed with GDM during third pregnancy and then progressed to DM type 2 immediately after.   Patient has not had hospitalizations for blood sugar issues    Prior glycohemoglobin were 9.4% 4/2024  Glucose in clinic today - 218 mg/dl    Dietary compliance: Poor     Recall:  Breakfast- skips typically or muffin or bagel   Lunch- sandwich or salad or leftovers from dinner.   Dinner- rice, with protein, vegetable   Snack- more snacking with increased stress in the last few months   Beverages- water, or coffee in the morning     Exercise: Nothing regular but active at work, walking more and taking stairs     Polyuria/polydipsia: No  Blurred vision: No    Episodes of hypoglycemia: Yes- rarely - has been > 6 months since last one   Blood Glucose:  Checking 2 times daily     Fasting- 140-180  Evening - 200's     Current DM Regimen:  Semglee- 30 units subcutaneous nightly   Metformin - 1000mg PO BID     Modifying factors:  Medication adherence: Yes   Recent steroids, illness or infections: Significant stress in the last year       REVIEW OF SYSTEMS  Eyes: Diabetic retinopathy present: No            Most recent visit to eye doctor in last 12 months: Yes- 11/2023- Dr. Iqbal    CV: Cardiovascular disease present: No         Hypertension present: No         Hyperlipidemia present: No         Peripheral Vascular Disease present: No    : Nephropathy present: No    Neuro: Neuropathy present: No, denies symptoms    Skin:  Infection or ulceration: No    Osteoporosis: No    Thyroid disease: Yes      Medications:     Current Outpatient Medications:     dapagliflozin (FARXIGA) 10 MG Oral Tab, Take 1 tablet (10 mg total) by mouth daily., Disp: 90 tablet, Rfl: 1    Blood Glucose Monitoring Suppl (ONETOUCH VERIO) w/Device Does not apply Kit, 1 each daily., Disp: 1 kit, Rfl: 0    Glucose Blood (ONETOUCH VERIO) In Vitro Strip, Test 2x daily, Disp: 200 strip, Rfl: 1    OneTouch Delica Lancets 33G Does not apply Misc, Test 2 x daily, Disp: 200 each, Rfl: 1    losartan 50 MG Oral Tab, Take 1 tablet (50 mg total) by mouth daily., Disp: 90 tablet, Rfl: 1    atorvastatin 40 MG Oral Tab, Take 1 tablet (40 mg total) by mouth nightly., Disp: 90 tablet, Rfl: 3    methIMAzole 5 MG Oral Tab, Take 1 tablet (5 mg total) by mouth daily., Disp: 90 tablet, Rfl: 0    Insulin Glargine-yfgn 100 UNIT/ML Subcutaneous Solution Pen-injector, , Disp: , Rfl:     sertraline 100 MG Oral Tab, Take 0.5 tablets (50 mg total) by mouth daily., Disp: 45 tablet, Rfl: 3    Insulin Pen Needle (BD PEN NEEDLE SHORT U/F) 31G X 8 MM Does not apply Misc, Take 1 Bottle by mouth As Directed., Disp: 100 each, Rfl: 3    insulin glargine 100 UNIT/ML Subcutaneous Solution, Inject 20 Units into the skin nightly. Needs appointment for additional refills., Disp: 1 each, Rfl: 0    propranolol 10 MG Oral Tab, Take 1 tablet (10 mg total) by mouth daily., Disp: 90 tablet, Rfl: 1    propranolol 10 MG Oral Tab, Take 1 tablet (10 mg total) by mouth daily., Disp: 90 tablet, Rfl: 1    metFORMIN HCl 1000 MG Oral Tab, Take 1 tablet (1,000 mg total) by mouth 2 (two) times daily., Disp: 180 tablet, Rfl: 3     Allergies:   No Known Allergies    Social History:   Social History     Socioeconomic History    Marital status: Legally    Tobacco Use    Smoking status: Never    Smokeless tobacco: Never   Vaping Use    Vaping status: Never Used   Substance and Sexual Activity    Alcohol use: Never    Drug  use: Never       Medical History:   Past Medical History:    Diabetes (HCC)    Hyperthyroidism       Surgical history:   Past Surgical History:   Procedure Laterality Date    D & c           PHYSICAL EXAM  Vitals:    06/03/24 1111   BP: 116/82   Pulse: 86   Weight: 180 lb (81.6 kg)       General Appearance:  alert, well developed, in no acute distress  Eyes:  normal conjunctivae, sclera, and normal pupils  Neck: Trachea midline: Normal  Back: no kyphosis or back tenderness  Lymph Nodes:  No abnormal nodes noted  Musculoskeletal:  normal muscle strength and tone  Skin:  normal moisture and skin texture  Hair & Nails:  normal scalp hair     Hematologic:  no excessive bruising  Neuro:  sensory grossly intact and motor grossly intact.  Psychiatric:  oriented to time, self, and place  Nutritional:  no abnormal weight gain or loss      ASSESSMENT/PLAN:    Diabetes mellitus type 2 uncontrolled   - HgA1c- 9.4%  -Reviewed ABC's of diabetes   - Reviewed pathogenesis of diabetes.   - Reviewed importance of good glycemic control to prevent microvascular and macrovascular complications including nephropathy, neuropathy, retinopathy, and cardiovascular disease.  - Reviewed importance of SBGM- check glucose 2 times daily   - Reviewed target glucose goals for patient  fasting and <180 post prandially   - Reviewed importance of following diabetic diet- recommended 135 grams of CHO per day or 45 grams per meal.   - Provided patient education materials    - Continue Semglee 30 units subcutaneous daily. Reviewed insulin timing and administration.     - Continue Metformin 1000 mg  PO BID.     - Start Farxiga 10mg PO daily in the morning. Reviewed side effects and risks vs benefits of medication. Reviewed importance of staying well hydrated on medication and importance of contacting clinic with any symptoms of dysuria.     - Reviewed importance of SBGM- discussed option of personal CGM but she declines and is not interested in CGM.  Prefers to check manually- prescription for new glucometer sent to pharmacy.     -normotensive   - Lipids 3/2024- LDL- 50, on statin   - No nephropathy- last lab test 3/2024, on losartan   - UTD with optho, reviewed importance of yearly optho follow up  - Last foot exam 10/2023 with podiatry.     RTC in 2 months   6/3/2024  SALOMON Pierre    A total of  40 minutes was spent on obtaining history, reviewing pertinent imaging/labs and specialists notes, evaluating patient, providing multiple treatment options, reinforcing diet/exercise and compliance, and completing documentation.

## 2024-06-06 RX ORDER — DAPAGLIFLOZIN 10 MG/1
10 TABLET, FILM COATED ORAL DAILY
Qty: 90 TABLET | Refills: 1 | Status: SHIPPED | OUTPATIENT
Start: 2024-06-06

## 2024-06-06 NOTE — TELEPHONE ENCOUNTER
Spoke to pharmacist: patient trying to use farxiga co-pay/savings card - RX needs to be sent by provider with RAHUL   Per SALOMON Burch: ok to send farxiga from Dr. Becerra  RX resent

## 2024-06-24 ENCOUNTER — PATIENT MESSAGE (OUTPATIENT)
Dept: FAMILY MEDICINE CLINIC | Facility: CLINIC | Age: 49
End: 2024-06-24

## 2024-06-24 NOTE — TELEPHONE ENCOUNTER
From: Laureen Hill  To: Aidee Brantley  Sent: 6/24/2024 9:30 AM CDT  Subject: Dr. Fercho Acharya.    I was trying to schedule an appointment with you, but there is no availability until mid July and I need something sooner. Or please call me so we can discuss what I need. It may not require a visit.     Thank you

## 2024-06-26 NOTE — TELEPHONE ENCOUNTER
Patient scheduled.    Future Appointments   Date Time Provider Department Center   6/27/2024  1:00 PM [facility]   7/11/2024  9:00 AM [address]   9/3/2024 11:30 AM Mela Jesus APRN [facility]

## 2024-07-02 ENCOUNTER — TELEPHONE (OUTPATIENT)
Dept: FAMILY MEDICINE CLINIC | Facility: CLINIC | Age: 49
End: 2024-07-02

## 2024-07-02 NOTE — TELEPHONE ENCOUNTER
Family Medical Leave Act forms received and logged for processing. No Release of Information - patient sent [employer] message.    Patient called to check status of forms - Informed patient Family Medical Leave Act forms received. Details were provided.      Type of Leave: Family Medical Leave Act continuous  Reason for Leave: Depression / Grief  Start date of leave: Start: 6/27/24  End: 8/24/24  How much time needed?:   Forms Due Date:  Was Fee and Turnaround info Given?:Yes

## 2024-07-05 NOTE — TELEPHONE ENCOUNTER
Partial Release of Information sent no signature page, only 1st page scanned into chart.   2nd email sent as well w/ Release of Information and it's COMPLETE and signed, scanned into chart as well

## 2024-07-11 ENCOUNTER — HOSPITAL ENCOUNTER (OUTPATIENT)
Dept: MAMMOGRAPHY | Age: 49
Discharge: HOME | End: 2024-07-11
Attending: FAMILY MEDICINE
Payer: COMMERCIAL

## 2024-07-11 DIAGNOSIS — Z12.31 ENCOUNTER FOR SCREENING MAMMOGRAM FOR MALIGNANT NEOPLASM OF BREAST: ICD-10-CM

## 2024-07-11 PROCEDURE — 77063 BREAST TOMOSYNTHESIS BI: CPT | Performed by: FAMILY MEDICINE

## 2024-07-11 PROCEDURE — 77067 SCR MAMMO BI INCL CAD: CPT | Performed by: FAMILY MEDICINE

## 2024-07-12 ENCOUNTER — PATIENT MESSAGE (OUTPATIENT)
Dept: FAMILY MEDICINE CLINIC | Facility: CLINIC | Age: 49
End: 2024-07-12

## 2024-07-15 ENCOUNTER — TELEPHONE (OUTPATIENT)
Dept: FAMILY MEDICINE CLINIC | Facility: CLINIC | Age: 49
End: 2024-07-15

## 2024-07-15 NOTE — TELEPHONE ENCOUNTER
From: Laureen Hill  To: Aidee Brantley  Sent: 7/12/2024 7:13 PM CDT  Subject: FMLA/Short Term Disability     Hi Dr. Brantley.    I received a call from [employer] stating they have not received the certification form from the Forms Dept. They need to receive it by the 18th or my claim will be denied. I've sent a message to the Forms Dept and will also call them on Monday, but can you also have someone follow up on this? PLEASE and thank you.

## 2024-07-15 NOTE — TELEPHONE ENCOUNTER
Tasked to Dr EDUARDO Brantley/ [facility] clinical staff and Forms dept - see pts' mychart message and advise further on this matter.    Please reply to pool: EM RN TRIAGE

## 2024-07-15 NOTE — TELEPHONE ENCOUNTER
[employer] is processing a claim for disability benefits for patient. They are requesting additional information in order to assist in the evaluation. Request emailed to [email or web address], original left in the [place] Office.

## 2024-07-16 NOTE — TELEPHONE ENCOUNTER
Please try to avoid signing forms in the corner as it is not visible when printing and forms are not accepted this way. Thank you!    Dr. Brantley,    **The ACKNOWLEDGE button has been moved to the top right ribbon**    Please sign off on form if you agree to: Disability due to grief and depression  Start 6/27/24 end pending re-eval with you on 7/24/24  (place your signature on the first page only)  -From your Inbasket, Highlight the patient and click Chart  -Double click the 7/15/24 Forms Completion telephone encounter  -Scroll down to the Media section  -Click the blue Hyperlink: disability Dr. Brantley 7/16/24    -Click Acknowledge located in the top right ribbon/menu  -Drag the mouse into the blank space of the document and a + sign will appear. Left click to  electronically sign the document.    Thank you,    Agustina

## 2024-07-16 NOTE — TELEPHONE ENCOUNTER
Linwood Pérez, can you please help me with this.  When I tried to route it to EM triage or EM forms completion it says no recipient valid.  Can you try to contact the forms department and find out where this form is?  Thank you so much Meka Brantley

## 2024-07-17 NOTE — TELEPHONE ENCOUNTER
Forms signed. Sent to patient's mychart. Release of Information states fax number as our own in the Forms Department [phone]. Informed patient of this in the mychart msg.

## 2024-07-22 NOTE — TELEPHONE ENCOUNTER
Please Review. Protocol Failed; No Protocol   Lab Results   Component Value Date     TSH 0.941 02/04/2023     Requested Prescriptions   Pending Prescriptions Disp Refills    METHIMAZOLE 5 MG Oral Tab [Pharmacy Med Name: METHIMAZOLE 5MG TABLETS] 90 tablet 0     Sig: TAKE 1 TABLET(5 MG) BY MOUTH DAILY       Hyperthyroid Medication Protocol Failed - 7/18/2024 11:57 AM        Failed - TSH resulted in past 12 months        Failed - Last TSH within normal limits     Lab Results   Component Value Date    TSH 0.941 02/04/2023                 Passed - In person appointment or virtual visit in the past 12 mos or appointment in next 3 mos     Recent Outpatient Visits              3 weeks ago Reactive depression    [facility] Aidee Brantley MD    Office Visit    1 month ago Type 2 diabetes mellitus without complication, without long-term current use of insulin ([facility])    [facility] Mela Jesus APRN    Office Visit    2 months ago Type 2 diabetes mellitus without complication, without long-term current use of insulin ([facility])    [facility] Aidee Brantley MD    Office Visit    4 months ago Type 2 diabetes mellitus without complication, without long-term current use of insulin ([facility])    [facility] Aidee Brantley MD    Office Visit    10 months ago Type 2 diabetes mellitus without complication, without long-term current use of insulin ([facility])    [facility] Aidee Brantley MD    Office Visit          Future Appointments         Provider Department Appt Notes    In 2 days Aidee Brantley MD [facility] Re-eval to release to return to work    In 1 month Mela Jesus APRN [facility] Diabetes and hyperthyroidism                            Future Appointments         Provider Department Appt Notes    In 2 days Aidee Brantley MD [facility] Re-eval to release to return to work    In 1 month Mela Jesus APRN [facility] Diabetes and hyperthyroidism          Recent Outpatient Visits              3 weeks ago Reactive depression    [facility] Aidee Brantley MD    Office Visit    1 month ago Type 2 diabetes mellitus without complication, without long-term current use of insulin ([facility])    [facility] Mela Jesus APRN    Office Visit    2 months ago Type 2 diabetes mellitus without complication, without long-term current use of insulin ([facility])    [facility] Aidee Brantley MD    Office Visit    4 months ago Type 2 diabetes mellitus without complication, without long-term current use of insulin ([facility])    [facility] Aidee Brantley MD    Office Visit    10 months ago Type 2 diabetes mellitus without complication, without long-term current use of insulin ([facility])    [facility] Aidee Brantley MD    Office Visit

## 2024-07-23 RX ORDER — METHIMAZOLE 5 MG/1
5 TABLET ORAL DAILY
Qty: 90 TABLET | Refills: 0 | OUTPATIENT
Start: 2024-07-23

## 2024-07-23 RX ORDER — METHIMAZOLE 5 MG/1
5 TABLET ORAL DAILY
Qty: 90 TABLET | Refills: 3 | Status: SHIPPED | OUTPATIENT
Start: 2024-07-23

## 2024-07-29 ENCOUNTER — PATIENT MESSAGE (OUTPATIENT)
Dept: FAMILY MEDICINE CLINIC | Facility: CLINIC | Age: 49
End: 2024-07-29

## 2024-07-29 NOTE — TELEPHONE ENCOUNTER
From: Laureen Hill  To: Aidee Brantley  Sent: 7/29/2024 11:54 AM CDT  Subject: FMLA Re-eval Appt    Dr. Fercho Matias.    My FMLA re-eval is scheduled for August 22nd. However, [facility] has approved my leave through the 26th. In order to return to work, I also have to be cleared by [employer] and they require I be cleared 5 days before my return to work. That being said, my appointment with you should happen on the 19th. [facility], but there are no available appointments for that day. Can you please work something out?     Thanks!  Laureen

## 2024-08-19 ENCOUNTER — PATIENT MESSAGE (OUTPATIENT)
Dept: FAMILY MEDICINE CLINIC | Facility: CLINIC | Age: 49
End: 2024-08-19

## 2024-08-20 NOTE — TELEPHONE ENCOUNTER
From: Laureen Hill  To: Aidee Brantley  Sent: 8/19/2024 12:11 PM CDT  Subject: New Pharmacy     [facility]. I need a refill on the propranolol. Today, however, I need it to be sent to a different pharmacy. Details below:    [facility]  [facility] [phone]    Thanks!

## 2024-08-21 ENCOUNTER — OFFICE VISIT (OUTPATIENT)
Dept: FAMILY MEDICINE CLINIC | Facility: CLINIC | Age: 49
End: 2024-08-21
Payer: COMMERCIAL

## 2024-08-21 ENCOUNTER — PATIENT MESSAGE (OUTPATIENT)
Dept: OTHER | Age: 49
End: 2024-08-21

## 2024-08-21 VITALS
BODY MASS INDEX: 29.19 KG/M2 | HEART RATE: 102 BPM | SYSTOLIC BLOOD PRESSURE: 125 MMHG | DIASTOLIC BLOOD PRESSURE: 86 MMHG | WEIGHT: 186 LBS | HEIGHT: 67 IN

## 2024-08-21 DIAGNOSIS — E78.5 HYPERLIPIDEMIA, UNSPECIFIED HYPERLIPIDEMIA TYPE: ICD-10-CM

## 2024-08-21 DIAGNOSIS — Z12.11 COLON CANCER SCREENING: ICD-10-CM

## 2024-08-21 DIAGNOSIS — E11.9 TYPE 2 DIABETES MELLITUS WITHOUT COMPLICATION, WITHOUT LONG-TERM CURRENT USE OF INSULIN (HCC): ICD-10-CM

## 2024-08-21 DIAGNOSIS — Z00.00 ROUTINE HEALTH MAINTENANCE: Primary | ICD-10-CM

## 2024-08-21 LAB — HEMOGLOBIN A1C: 9.6 % (ref 4.3–5.6)

## 2024-08-21 PROCEDURE — 83036 HEMOGLOBIN GLYCOSYLATED A1C: CPT | Performed by: FAMILY MEDICINE

## 2024-08-21 PROCEDURE — 99396 PREV VISIT EST AGE 40-64: CPT | Performed by: FAMILY MEDICINE

## 2024-08-21 RX ORDER — ATORVASTATIN CALCIUM 40 MG/1
40 TABLET, FILM COATED ORAL NIGHTLY
Qty: 90 TABLET | Refills: 3 | Status: SHIPPED | OUTPATIENT
Start: 2024-08-21

## 2024-08-21 RX ORDER — PROPRANOLOL HYDROCHLORIDE 10 MG/1
10 TABLET ORAL DAILY
Qty: 90 TABLET | Refills: 3 | Status: SHIPPED | OUTPATIENT
Start: 2024-08-21

## 2024-08-21 RX ORDER — ATORVASTATIN CALCIUM 40 MG/1
40 TABLET, FILM COATED ORAL NIGHTLY
Qty: 90 TABLET | Refills: 3 | Status: SHIPPED | OUTPATIENT
Start: 2024-08-21 | End: 2024-08-21

## 2024-08-21 RX ORDER — PROPRANOLOL HYDROCHLORIDE 10 MG/1
10 TABLET ORAL DAILY
Qty: 90 TABLET | Refills: 1 | Status: SHIPPED | OUTPATIENT
Start: 2024-08-21

## 2024-08-21 RX ORDER — PROPRANOLOL HYDROCHLORIDE 10 MG/1
10 TABLET ORAL DAILY
Qty: 90 TABLET | Refills: 1 | OUTPATIENT
Start: 2024-08-21

## 2024-08-21 NOTE — PROGRESS NOTES
Laureen Hill is a 49 year old female.  Chief Complaint   Patient presents with    Depression     Pt in for f/u on therapy, would like to go back to work    Will schedule Px today.       HPI:   Patient is a 49-year-old female who presents today for routine physical exam to follow-up on her depression and check of her diabetes.  Patient feels she is able to return to work at this time letter written today to go back August 26 without restrictions.  Patient needs a refill on her atorvastatin.  Patient will return for her Pap smear.    Current Outpatient Medications   Medication Sig Dispense Refill    propranolol 10 MG Oral Tab Take 1 tablet (10 mg total) by mouth daily. 90 tablet 3    atorvastatin 40 MG Oral Tab Take 1 tablet (40 mg total) by mouth nightly. 90 tablet 3    methIMAzole 5 MG Oral Tab Take 1 tablet (5 mg total) by mouth daily. 90 tablet 3    dapagliflozin (FARXIGA) 10 MG Oral Tab Take 1 tablet (10 mg total) by mouth daily. 90 tablet 1    Blood Glucose Monitoring Suppl (ONETOUCH VERIO) w/Device Does not apply Kit 1 each daily. 1 kit 0    Glucose Blood (ONETOUCH VERIO) In Vitro Strip Test 2x daily 200 strip 1    OneTouch Delica Lancets 33G Does not apply Misc Test 2 x daily 200 each 1    losartan 50 MG Oral Tab Take 1 tablet (50 mg total) by mouth daily. 90 tablet 1    Insulin Glargine-yfgn 100 UNIT/ML Subcutaneous Solution Pen-injector       sertraline 100 MG Oral Tab Take 0.5 tablets (50 mg total) by mouth daily. 45 tablet 3    Insulin Pen Needle (BD PEN NEEDLE SHORT U/F) 31G X 8 MM Does not apply Misc Take 1 Bottle by mouth As Directed. 100 each 3    insulin glargine 100 UNIT/ML Subcutaneous Solution Inject 20 Units into the skin nightly. Needs appointment for additional refills. 1 each 0    propranolol 10 MG Oral Tab Take 1 tablet (10 mg total) by mouth daily. 90 tablet 1    metFORMIN HCl 1000 MG Oral Tab Take 1 tablet (1,000 mg total) by mouth 2 (two) times daily. 180 tablet 3    propranolol 10 MG  Oral Tab Take 1 tablet (10 mg total) by mouth daily. 90 tablet 1      Past Medical History:    Diabetes (HCC)    Hyperthyroidism      Past Surgical History:   Procedure Laterality Date    D & c        Social History:  Social History     Socioeconomic History    Marital status: Legally    Tobacco Use    Smoking status: Never    Smokeless tobacco: Never   Vaping Use    Vaping status: Never Used   Substance and Sexual Activity    Alcohol use: Never    Drug use: Never     Social Determinants of Health     Food Insecurity: No Food Insecurity (7/26/2023)    [facility]    Hunger Vital Sign     Worried About Running Out of Food in the Last Year: Never true     Ran Out of Food in the Last Year: Never true        REVIEW OF SYSTEMS:   GENERAL HEALTH: No fevers, chills, sweats, fatigue  VISION: No recent vision problems, blurry vision or double vision  HEENT: No decreased hearing ear pain nasal congestion or sore throat  SKIN: denies any unusual skin lesions or rashes  RESPIRATORY: denies shortness of breath, cough, wheezing  CARDIOVASCULAR: denies chest pain on exertion, palpitations, swelling in feet  GI: denies abdominal pain and denies heartburn, nausea or vomiting  : No Pain on urination, change in the color of urine, discharge, urinating frequently  MUS: No back pain, joint pain, muscle pain  NEURO: denies headaches , anxiety, depression    EXAM:   /86   Pulse 102   Ht 5' 7\" (1.702 m)   Wt 186 lb (84.4 kg)   LMP 08/02/2024 (Exact Date)   BMI 29.13 kg/m²   GENERAL: well developed, well nourished,in no apparent distress  SKIN: no rashes,no suspicious lesions  HEENT: atraumatic, normocephalic,ears and throat are clear,   NECK: supple,no adenopathy,  LUNGS: clear to auscultation, no wheeze  CARDIO: RRR without murmur  GI: good BS's,no masses or tenderness  EXTREMITIES: no cyanosis, or edema  Bilateral barefoot skin diabetic exam is  normal, visualized feet and the appearance is normal.  Bilateral monofilament/sensation of both feet is normal.  Pulsation pedal pulse exam of both lower legs/feet is normal as well.      ASSESSMENT AND PLAN:   1. Routine health maintenance  Discussed diet and exercise with patient today.  Patient and son exploring a wellness journey together.  Very proud of patient    2. Colon cancer screening  Will give FIT card today patient to return in the mail as soon as possible  - Occult Blood, Fecal, Immunoassay (Blue cards) [E]; Future    3. Hyperlipidemia, unspecified hyperlipidemia type  Refilled atorvastatin printed prescription suggested using [employer]    4. Type 2 diabetes mellitus without complication, without long-term current use of insulin ([facility])  Check hemoglobin A1c today diabetic foot exam  Hemoglobin A1c slightly improved to 9.6  - POC Glycohemoglobin [95531]       The patient indicates understanding of these issues and agrees to the plan.  No follow-ups on file.

## 2024-08-22 NOTE — TELEPHONE ENCOUNTER
Please Review. Protocol Failed; No Protocol   Lab Results   Component Value Date     A1C 9.6 (A) 08/21/2024     Requested Prescriptions   Pending Prescriptions Disp Refills    metFORMIN HCl 1000 MG Oral Tab 180 tablet 3     Sig: Take 1 tablet (1,000 mg total) by mouth 2 (two) times daily.       Diabetes Medication Protocol Failed - 8/21/2024  5:25 PM        Failed - Last A1C < 7.5 and within past 6 months     Lab Results   Component Value Date    A1C 9.6 (A) 08/21/2024             Passed - In person appointment or virtual visit in the past 6 mos or appointment in next 3 mos     Recent Outpatient Visits              Yesterday Routine health maintenance    [facility], [facility] Aidee Brantley MD    Office Visit    4 weeks ago Reactive depression    [facility] Aidee Brantley MD    Office Visit    1 month ago Reactive depression    [facility] Aidee Brantley MD    Office Visit    2 months ago Type 2 diabetes mellitus without complication, without long-term current use of insulin ([facility])    [facility] Mela Jesus APRN    Office Visit    4 months ago Type 2 diabetes mellitus without complication, without long-term current use of insulin ([facility])    [facility] Aidee Brantley MD    Office Visit          Future Appointments         Provider Department Appt Notes    In 1 week Mela Jesus APRN [facility] Diabetes and hyperthyroidism                    Passed - Microalbumin procedure in past 12 months or taking ACE/ARB        Passed - EGFRCR or GFRNAA > 50     GFR Evaluation  EGFRCR: 104 , resulted on 3/21/2024          Passed - GFR in the past 12 months               Future Appointments         Provider Department Appt Notes    In 1 week Mela Jesus  SALOMON Ferrara [facility] Diabetes and hyperthyroidism          Recent Outpatient Visits              Yesterday Routine health maintenance    [facility], [facility] Aidee Brantley MD    Office Visit    4 weeks ago Reactive depression    [facility], [facility] Aidee Brantley MD    Office Visit    1 month ago Reactive depression    [facility], [facility] Aidee Brantley MD    Office Visit    2 months ago Type 2 diabetes mellitus without complication, without long-term current use of insulin ([facility])    [facility] Mela Jesus APRN    Office Visit    4 months ago Type 2 diabetes mellitus without complication, without long-term current use of insulin ([facility])    [facility] Aidee Brantley MD    Office Visit

## 2024-10-31 ENCOUNTER — TELEPHONE (OUTPATIENT)
Dept: FAMILY MEDICINE CLINIC | Facility: CLINIC | Age: 49
End: 2024-10-31

## 2024-12-04 ENCOUNTER — PATIENT MESSAGE (OUTPATIENT)
Dept: FAMILY MEDICINE CLINIC | Facility: CLINIC | Age: 49
End: 2024-12-04

## 2024-12-04 ENCOUNTER — NURSE TRIAGE (OUTPATIENT)
Dept: FAMILY MEDICINE CLINIC | Facility: CLINIC | Age: 49
End: 2024-12-04

## 2024-12-04 NOTE — TELEPHONE ENCOUNTER
Action Requested: Summary for Provider     []  Critical Lab, Recommendations Needed  [] Need Additional Advice  []   FYI    []   Need Orders  [] Need Medications Sent to Pharmacy  []  Other     SUMMARY:  please see patient Mychart message below. Patient did call the office and she stated that she has been sick since Friday. She felt chest congestion and nasal congestion. Last night she coughed up phlegm and it was mixed with bright red blood. Patient is concern.  Patient denied having a fever,chest pain or feeling short of breath. Inform patient she will need to come in for a evaluation. You have no available appointment today or tomorrow. Would you be able to see patient today at 4 pm? Patient is currently at work but she can ask to leave early. Please advise if you can see patient today or tomorrow? Thank you     Reason for call: Cough  Onset: Friday    Laureen Hill to P Em Rn Triage (supporting Aidee Brantley MD)   RB      12/4/24  7:18 AM  Hi Dr. Brantley.      I am currently experiencing congestion and a slight cough. Last night, I coughed up phlegm for the first time since being sick and there was blood in it. Please advise on what to do.     Laureen  Reason for Disposition  • Patient wants to be seen    Protocols used: Coughing Up Blood-A-OH

## 2024-12-04 NOTE — TELEPHONE ENCOUNTER
Please call pt and have her go to urgent care. I have already maxed out on the extra patients I have told to come in today and tomorrow

## 2024-12-04 NOTE — TELEPHONE ENCOUNTER
Spoke to patient (verified Name and ) and relayed Dr. Brantley's message below. Patient verbalized understanding and had no further questions or concerns at this time.

## 2024-12-09 RX ORDER — LOSARTAN POTASSIUM 50 MG/1
50 TABLET ORAL DAILY
Qty: 90 TABLET | Refills: 0 | Status: SHIPPED | OUTPATIENT
Start: 2024-12-09

## 2024-12-09 RX ORDER — LOSARTAN POTASSIUM 50 MG/1
50 TABLET ORAL DAILY
Qty: 90 TABLET | Refills: 1 | OUTPATIENT
Start: 2024-12-09

## 2024-12-09 NOTE — TELEPHONE ENCOUNTER
Endocrine Refill protocol for oral antihypertensive medications    Protocol Criteria:  FAILED  Reason: No Visit in required time frame     If all below requirements are met, send a 90-day supply with 1 refill per provider protocol.    Verify appointment with Endocrinology completed in the last 6 months or scheduled in the next 3 months.  Verify BMP or CMP completed in the last 12 months   Verify last GFR result is greater than or equal to 50     Last completed office visit:6/3/2024 Mela Jesus APRN   Next scheduled Follow up: pt cancelled 2 fu appts     Last BMP or CMP completion date:  Lab Results   Component Value Date    GFRAA 117 09/04/2021    GFRNAA 101 09/04/2021    EGFRCR 104 03/21/2024

## 2024-12-09 NOTE — TELEPHONE ENCOUNTER
Endocrine Refill protocol for oral antihypertensive medications    Protocol Criteria:  FAILED  Reason: No Visit in required time frame     If all below requirements are met, send a 90-day supply with 1 refill per provider protocol.    Verify appointment with Endocrinology completed in the last 6 months or scheduled in the next 3 months.  Verify BMP or CMP completed in the last 12 months   Verify last GFR result is greater than or equal to 50     Last completed office visit:6/3/2024 Mela Jesus APRN   Next scheduled Follow up:     Last BMP or CMP completion date:  Lab Results   Component Value Date    GFRAA 117 09/04/2021    GFRNAA 101 09/04/2021    EGFRCR 104 03/21/2024

## 2025-01-02 RX ORDER — BLOOD SUGAR DIAGNOSTIC
STRIP MISCELLANEOUS
Qty: 200 STRIP | Refills: 0 | Status: SHIPPED | OUTPATIENT
Start: 2025-01-02

## 2025-01-02 NOTE — TELEPHONE ENCOUNTER
Yash Burhc pended for review.  sent for pt to make appt.     Endocrine Refill protocol for Glucose testing supplies     Protocol Criteria: FAILED Reason: No Visit in required time frame    If below requirement is met, send a 90-day supply with 1 refill per provider protocol.    Verify appointment with Endocrinology completed in the last 6 months or scheduled in the next 3 months.    Last completed office visit: 6/3/2024 Mela Jesus APRN   Next scheduled Follow up:   Future Appointments   Date Time Provider Department Center   1/9/2025  2:40 PM Aidee Brantley MD [facility]

## 2025-02-07 RX ORDER — INSULIN GLARGINE-YFGN 100 [IU]/ML
20 INJECTION, SOLUTION SUBCUTANEOUS NIGHTLY
Qty: 6 ML | Refills: 0 | Status: SHIPPED | OUTPATIENT
Start: 2025-02-07

## 2025-02-07 NOTE — TELEPHONE ENCOUNTER
Please review. Protocol Failed; No Protocol    Routing to [employer]mates due to High Priority status and Dr. Brantley is out of office.      Future Appointments   Date Time Provider Department Center   2/12/2025  1:00 PM Aidee Brantley MD [facility]         Requested Prescriptions   Pending Prescriptions Disp Refills    SEMGLEE, YFGN, 100 UNIT/ML Subcutaneous Solution Pen-injector [Pharmacy Med Name: SEMGLEE (YFGN) 100 UNIT/ML PEN] 6 mL 0     Sig: Inject 20 Units into the skin nightly.       Diabetes Medication Protocol Failed - 2/7/2025 12:06 PM        Failed - Last A1C < 7.5 and within past 6 months     Lab Results   Component Value Date    A1C 9.6 (A) 08/21/2024             Failed - Medication is active on med list        Passed - In person appointment or virtual visit in the past 6 mos or appointment in next 3 mos     Recent Outpatient Visits              5 months ago Routine health maintenance    [facility] Aidee Brantley MD    Office Visit    6 months ago Reactive depression    [facility] Aidee Brantley MD    Office Visit    7 months ago Reactive depression    [facility] Aidee Brantley MD    Office Visit    8 months ago Type 2 diabetes mellitus without complication, without long-term current use of insulin ([facility])    [facility] Mela Jesus APRN    Office Visit    9 months ago Type 2 diabetes mellitus without complication, without long-term current use of insulin ([facility])    [facility] Aidee Brantley MD    Office Visit          Future Appointments         Provider Department Appt Notes    In 5 days Aidee Brantley MD [facility] Diabetes follow up                    Passed - Microalbumin procedure in past 12 months or taking  ACE/ARB        Passed - EGFRCR or GFRNAA > 50     GFR Evaluation  EGFRCR: 104 , resulted on 3/21/2024          Passed - GFR in the past 12 months               Future Appointments         Provider Department Appt Notes    In 5 days Aidee Brantley MD [facility] Diabetes follow up          Recent Outpatient Visits              5 months ago Routine health maintenance    [facility] Aidee Brantley MD    Office Visit    6 months ago Reactive depression    [facility] Aidee Brantley MD    Office Visit    7 months ago Reactive depression    [facility] Aidee Brantley MD    Office Visit    8 months ago Type 2 diabetes mellitus without complication, without long-term current use of insulin ([facility])    [facility] Mela Jesus APRN    Office Visit    9 months ago Type 2 diabetes mellitus without complication, without long-term current use of insulin ([facility])    [facility] Aidee Brantley MD    Office Visit

## 2025-03-11 RX ORDER — LOSARTAN POTASSIUM 50 MG/1
50 TABLET ORAL DAILY
Qty: 90 TABLET | Refills: 0 | Status: SHIPPED | OUTPATIENT
Start: 2025-03-11

## 2025-03-11 NOTE — TELEPHONE ENCOUNTER
Endocrine Refill protocol for oral antihypertensive medications    Protocol Criteria:  FAILED  Reason: No Visit in required time frame mcm sent     If all below requirements are met, send a 90-day supply with 1 refill per provider protocol.    Verify appointment with Endocrinology completed in the last 6 months or scheduled in the next 3 months.  Verify BMP or CMP completed in the last 12 months   Verify last GFR result is greater than or equal to 50     Last completed office visit:6/3/2024 Mela Jesus APRN   Next scheduled Follow up: no future appt mcm sent     Last BMP or CMP completion date:3/21/24  Lab Results   Component Value Date    GFRAA 117 09/04/2021    GFRNAA 101 09/04/2021    EGFRCR 104 03/21/2024

## 2025-03-13 ENCOUNTER — OFFICE VISIT (OUTPATIENT)
Dept: FAMILY MEDICINE CLINIC | Facility: CLINIC | Age: 50
End: 2025-03-13

## 2025-03-13 VITALS
OXYGEN SATURATION: 98 % | TEMPERATURE: 98 F | HEART RATE: 89 BPM | SYSTOLIC BLOOD PRESSURE: 123 MMHG | RESPIRATION RATE: 18 BRPM | BODY MASS INDEX: 29 KG/M2 | WEIGHT: 187 LBS | DIASTOLIC BLOOD PRESSURE: 85 MMHG

## 2025-03-13 DIAGNOSIS — E11.9 TYPE 2 DIABETES MELLITUS WITHOUT COMPLICATION, WITHOUT LONG-TERM CURRENT USE OF INSULIN (HCC): Primary | ICD-10-CM

## 2025-03-13 LAB
CREAT UR-SCNC: 127.1 MG/DL
HEMOGLOBIN A1C: 9.8 % (ref 4.3–5.6)
MICROALBUMIN UR-MCNC: 0.4 MG/DL
MICROALBUMIN/CREAT 24H UR-RTO: 3.1 UG/MG (ref ?–30)

## 2025-03-13 PROCEDURE — 99213 OFFICE O/P EST LOW 20 MIN: CPT | Performed by: FAMILY MEDICINE

## 2025-03-13 PROCEDURE — 83036 HEMOGLOBIN GLYCOSYLATED A1C: CPT | Performed by: FAMILY MEDICINE

## 2025-03-13 NOTE — PROGRESS NOTES
Laureen Hill is a 49 year old female.  Chief Complaint   Patient presents with    Follow - Up     Pt is here to f/u on diabetes and hyperthyroidism       HPI:   Patient is a 49-year-old female presents for follow-up type 2 diabetes.  Patient states she is taking her medication as stated in med list.  Hemoglobin A1c still 9.8.  Patient states she is working 50 to 60 hours a week.  Having trouble cooking.  And has no time for self-care.    Current Outpatient Medications   Medication Sig Dispense Refill    LOSARTAN 50 MG Oral Tab Take 1 tablet (50 mg total) by mouth daily. 90 tablet 0    Insulin Glargine-yfgn (SEMGLEE, YFGN,) 100 UNIT/ML Subcutaneous Solution Pen-injector Inject 20 Units into the skin nightly. 6 mL 0    ONETOUCH VERIO In Vitro Strip Test 2x daily 200 strip 0    metFORMIN HCl 1000 MG Oral Tab Take 1 tablet (1,000 mg total) by mouth 2 (two) times daily. 180 tablet 3    propranolol 10 MG Oral Tab Take 1 tablet (10 mg total) by mouth daily. 90 tablet 3    propranolol 10 MG Oral Tab Take 1 tablet (10 mg total) by mouth daily. 90 tablet 1    atorvastatin 40 MG Oral Tab Take 1 tablet (40 mg total) by mouth nightly. 90 tablet 3    methIMAzole 5 MG Oral Tab Take 1 tablet (5 mg total) by mouth daily. 90 tablet 3    dapagliflozin (FARXIGA) 10 MG Oral Tab Take 1 tablet (10 mg total) by mouth daily. 90 tablet 1    Blood Glucose Monitoring Suppl (ONETOUCH VERIO) w/Device Does not apply Kit 1 each daily. 1 kit 0    OneTouch Delica Lancets 33G Does not apply Misc Test 2 x daily 200 each 1    sertraline 100 MG Oral Tab Take 0.5 tablets (50 mg total) by mouth daily. 45 tablet 3    Insulin Pen Needle (BD PEN NEEDLE SHORT U/F) 31G X 8 MM Does not apply Misc Take 1 Bottle by mouth As Directed. 100 each 3    insulin glargine 100 UNIT/ML Subcutaneous Solution Inject 20 Units into the skin nightly. Needs appointment for additional refills. 1 each 0    propranolol 10 MG Oral Tab Take 1 tablet (10 mg total) by mouth daily.  90 tablet 1      Past Medical History:    Diabetes (HCC)    Hyperthyroidism      Past Surgical History:   Procedure Laterality Date    D & c        Social History:  Social History     Socioeconomic History    Marital status: Legally    Tobacco Use    Smoking status: Never     Passive exposure: Never    Smokeless tobacco: Never   Vaping Use    Vaping status: Never Used   Substance and Sexual Activity    Alcohol use: Never    Drug use: Never     Social Drivers of Health     Food Insecurity: No Food Insecurity (7/26/2023)    [facility]    Hunger Vital Sign     Worried About Running Out of Food in the Last Year: Never true     Ran Out of Food in the Last Year: Never true        REVIEW OF SYSTEMS:   GENERAL HEALTH: No fevers, chills, sweats, fatigue  VISION: No recent vision problems, blurry vision or double vision  HEENT: No decreased hearing ear pain nasal congestion or sore throat  SKIN: denies any unusual skin lesions or rashes  RESPIRATORY: denies shortness of breath, cough, wheezing  CARDIOVASCULAR: denies chest pain on exertion, palpitations, swelling in feet  GI: denies abdominal pain and denies heartburn, nausea or vomiting  : No Pain on urination, change in the color of urine, discharge, urinating frequently  MUS: No back pain, joint pain, muscle pain  NEURO: denies headaches , anxiety, depression    EXAM:   /85 (BP Location: Left arm, Patient Position: Sitting, Cuff Size: adult)   Pulse 89   Temp 97.8 °F (36.6 °C) (Temporal)   Resp 18   Wt 187 lb (84.8 kg)   LMP 02/19/2025 (Exact Date)   SpO2 98%   BMI 29.29 kg/m²   GENERAL: well developed, well nourished,in no apparent distress  SKIN: no rashes,no suspicious lesions  HEENT: atraumatic, normocephalic,ears and throat are clear,   NECK: supple,no adenopathy,  LUNGS: clear to auscultation, no wheeze  CARDIO: RRR without murmur  GI: good BS's,no masses or  tenderness  EXTREMITIES: no cyanosis, or edema    ASSESSMENT AND PLAN:   1. Type 2 diabetes mellitus without complication, without long-term current use of insulin (HCC)  Hemoglobin A1c 9.8.  Have made an appointment with diabetic nurse for follow-up to help choose add on medication.  Patient verbalizes understanding importance of this.  - POC Glycohemoglobin [71779]  - Microalb/Creat Ratio, Random Urine [E]; Future  - Microalb/Creat Ratio, Random Urine [E]       The patient indicates understanding of these issues and agrees to the plan.  No follow-ups on file.

## 2025-04-24 NOTE — TELEPHONE ENCOUNTER
Please Review. Protocol Failed; No Protocol     Lab Results   Component Value Date     A1C 9.8 (A) 03/13/2025

## 2025-04-28 RX ORDER — METHIMAZOLE 5 MG/1
5 TABLET ORAL DAILY
Qty: 90 TABLET | Refills: 3 | Status: SHIPPED | OUTPATIENT
Start: 2025-04-28

## 2025-05-14 ENCOUNTER — MED REC SCAN ONLY (OUTPATIENT)
Dept: FAMILY MEDICINE CLINIC | Facility: CLINIC | Age: 50
End: 2025-05-14

## 2025-05-30 NOTE — TELEPHONE ENCOUNTER
Patient is OUT of insulin pen Semglee and test strips. She states she was recently prescribed a steroid, so really needs the insulin as soon as possible.       Patient requesting to please send to [facility] in [employer] .  Patient states Dr. Brantley normally refills this medication, not Mela LATIF.

## 2025-05-30 NOTE — TELEPHONE ENCOUNTER
Endocrine refill protocol for basal insulins     Protocol Criteria: FAILED Reason: Elevated A1C    If all below requirements are met, send a 90-day supply with 1 refill per provider protocol.       Verify Appointment with Endocrinology completed in the last 6 months or scheduled in the next 3 months.  Verify A1C has been completed within the last 6 months and is below 8.5%     Last completed office visit:6/3/2024 Mela Jesus APRN   Last completed telemed visit: Visit date not found  Next scheduled Follow up:   Future Appointments   Date Time Provider Department Center   7/14/2025  2:00 PM Mela Jesus APRN [facility]      Last A1c result: Last A1c value was 9.8% done 3/13/2025.

## 2025-05-30 NOTE — TELEPHONE ENCOUNTER
Routing to podmate due to High Priority status and Dr. Brantley is out of office.     Please review.  Protocol failed / Has no protocol.    Marked High Priority, patient states out of medication    Patient is OUT of insulin pen Semglee and test strips. She states she was recently prescribed a steroid, so really needs the insulin as soon as possible.         Endocrinology usually prescribes test strips, patient asking PCP to sign.     Requested Prescriptions   Pending Prescriptions Disp Refills    Glucose Blood (ONETOUCH VERIO) In Vitro Strip 200 strip 3     Si strip by In Vitro route 2 (two) times daily.       Diabetic Supplies Protocol Passed - 2025  3:20 PM        Passed - In person appointment or virtual visit in the past 12 mos or appointment in next 3 mos        Passed - Medication is active on med list          Insulin Glargine-yfgn (SEMGLEE, YFGN,) 100 UNIT/ML Subcutaneous Solution Pen-injector 18 mL 1     Sig: Inject 20 Units into the skin nightly.       Diabetes Medication Protocol Failed - 2025  3:20 PM        Failed - Last A1C < 7.5 and within past 6 months        Failed - EGFRCR or GFRNAA > 50        Failed - GFR in the past 12 months        Passed - In person appointment or virtual visit in the past 6 mos or appointment in next 3 mos        Passed - Microalbumin procedure in past 12 months or taking ACE/ARB        Passed - Medication is active on med list          Insulin Pen Needle (BD PEN NEEDLE KYRIE 2ND GEN) 32G X 4 MM Does not apply Misc 100 each 3     Sig: Inject 1 Needle into the skin daily.       Diabetic Supplies Protocol Failed - 2025  3:20 PM        Failed - Medication is active on med list        Passed - In person appointment or virtual visit in the past 12 mos or appointment in next 3 mos          Lancet Devices (Ncube WorldUCH DELICA PLUS LANCING) Does not apply Misc 1 each 1     Si Lancet by Finger stick route 2 (two) times daily.       Diabetic Supplies Protocol Failed -  2025  3:20 PM        Failed - Medication is active on med list        Passed - In person appointment or virtual visit in the past 12 mos or appointment in next 3 mos          Lancets (ONETOUCH DELICA PLUS [id number]) Does not apply Misc 200 each 3     Si Lancet by Finger stick route 2 (two) times daily.       Diabetic Supplies Protocol Failed - 2025  3:20 PM        Failed - Medication is active on med list        Passed - In person appointment or virtual visit in the past 12 mos or appointment in next 3 mos

## 2025-05-31 RX ORDER — LANCING DEVICE/LANCETS
1 KIT MISCELLANEOUS 2 TIMES DAILY
Qty: 1 EACH | Refills: 1 | Status: SHIPPED | OUTPATIENT
Start: 2025-05-31

## 2025-05-31 RX ORDER — PEN NEEDLE, DIABETIC 32GX 5/32"
1 NEEDLE, DISPOSABLE MISCELLANEOUS DAILY
Qty: 100 EACH | Refills: 3 | Status: SHIPPED | OUTPATIENT
Start: 2025-05-31

## 2025-05-31 RX ORDER — LANCETS 33 GAUGE
1 EACH MISCELLANEOUS 2 TIMES DAILY
Qty: 200 EACH | Refills: 3 | Status: SHIPPED | OUTPATIENT
Start: 2025-05-31

## 2025-05-31 RX ORDER — INSULIN GLARGINE-YFGN 100 [IU]/ML
20 INJECTION, SOLUTION SUBCUTANEOUS NIGHTLY
Qty: 18 ML | Refills: 1 | Status: SHIPPED | OUTPATIENT
Start: 2025-05-31

## 2025-05-31 RX ORDER — BLOOD SUGAR DIAGNOSTIC
1 STRIP MISCELLANEOUS 2 TIMES DAILY
Qty: 200 STRIP | Refills: 3 | Status: SHIPPED | OUTPATIENT
Start: 2025-05-31

## 2025-06-11 RX ORDER — LOSARTAN POTASSIUM 50 MG/1
50 TABLET ORAL DAILY
Qty: 90 TABLET | Refills: 3 | Status: SHIPPED | OUTPATIENT
Start: 2025-06-11

## 2025-07-14 ENCOUNTER — LAB ENCOUNTER (OUTPATIENT)
Dept: LAB | Age: 50
End: 2025-07-14
Payer: COMMERCIAL

## 2025-07-14 ENCOUNTER — TELEPHONE (OUTPATIENT)
Dept: ENDOCRINOLOGY CLINIC | Facility: CLINIC | Age: 50
End: 2025-07-14

## 2025-07-14 ENCOUNTER — OFFICE VISIT (OUTPATIENT)
Dept: ENDOCRINOLOGY CLINIC | Facility: CLINIC | Age: 50
End: 2025-07-14
Payer: COMMERCIAL

## 2025-07-14 VITALS
BODY MASS INDEX: 29.03 KG/M2 | WEIGHT: 185 LBS | SYSTOLIC BLOOD PRESSURE: 117 MMHG | HEART RATE: 99 BPM | DIASTOLIC BLOOD PRESSURE: 82 MMHG | HEIGHT: 67 IN

## 2025-07-14 DIAGNOSIS — E11.65 UNCONTROLLED TYPE 2 DIABETES MELLITUS WITH HYPERGLYCEMIA (HCC): Primary | ICD-10-CM

## 2025-07-14 DIAGNOSIS — E11.65 UNCONTROLLED TYPE 2 DIABETES MELLITUS WITH HYPERGLYCEMIA (HCC): ICD-10-CM

## 2025-07-14 DIAGNOSIS — E05.90 HYPERTHYROIDISM: ICD-10-CM

## 2025-07-14 LAB
ALBUMIN SERPL-MCNC: 4.5 G/DL (ref 3.2–4.8)
ALBUMIN/GLOB SERPL: 1.7 {RATIO} (ref 1–2)
ALP LIVER SERPL-CCNC: 75 U/L (ref 39–100)
ALT SERPL-CCNC: 15 U/L (ref 10–49)
ANION GAP SERPL CALC-SCNC: 11 MMOL/L (ref 0–18)
AST SERPL-CCNC: 15 U/L (ref ?–34)
BILIRUB SERPL-MCNC: 0.6 MG/DL (ref 0.3–1.2)
BUN BLD-MCNC: 12 MG/DL (ref 9–23)
BUN/CREAT SERPL: 12.9 (ref 10–20)
CALCIUM BLD-MCNC: 9 MG/DL (ref 8.7–10.4)
CHLORIDE SERPL-SCNC: 103 MMOL/L (ref 98–112)
CHOLEST SERPL-MCNC: 98 MG/DL (ref ?–200)
CO2 SERPL-SCNC: 23 MMOL/L (ref 21–32)
CREAT BLD-MCNC: 0.93 MG/DL (ref 0.55–1.02)
CREAT UR-SCNC: 139.4 MG/DL
EGFRCR SERPLBLD CKD-EPI 2021: 75 ML/MIN/1.73M2 (ref 60–?)
FASTING PATIENT LIPID ANSWER: NO
FASTING STATUS PATIENT QL REPORTED: NO
GLOBULIN PLAS-MCNC: 2.7 G/DL (ref 2–3.5)
GLUCOSE BLD-MCNC: 281 MG/DL (ref 70–99)
GLUCOSE BLOOD: 302
HDLC SERPL-MCNC: 34 MG/DL (ref 40–59)
HEMOGLOBIN A1C: 10.3 % (ref 4.3–5.6)
LDLC SERPL CALC-MCNC: 46 MG/DL (ref ?–100)
MICROALBUMIN UR-MCNC: 0.6 MG/DL
MICROALBUMIN/CREAT 24H UR-RTO: 4.3 UG/MG (ref ?–30)
NONHDLC SERPL-MCNC: 64 MG/DL (ref ?–130)
OSMOLALITY SERPL CALC.SUM OF ELEC: 294 MOSM/KG (ref 275–295)
POTASSIUM SERPL-SCNC: 3.8 MMOL/L (ref 3.5–5.1)
PROT SERPL-MCNC: 7.2 G/DL (ref 5.7–8.2)
SODIUM SERPL-SCNC: 137 MMOL/L (ref 136–145)
T3FREE SERPL-MCNC: 2.33 PG/ML (ref 2.4–4.2)
T4 FREE SERPL-MCNC: 0.9 NG/DL (ref 0.8–1.7)
TEST STRIP LOT #: NORMAL NUMERIC
TRIGL SERPL-MCNC: 92 MG/DL (ref 30–149)
TSI SER-ACNC: 2.03 UIU/ML (ref 0.55–4.78)
VLDLC SERPL CALC-MCNC: 13 MG/DL (ref 0–30)

## 2025-07-14 PROCEDURE — 99214 OFFICE O/P EST MOD 30 MIN: CPT

## 2025-07-14 PROCEDURE — 83036 HEMOGLOBIN GLYCOSYLATED A1C: CPT

## 2025-07-14 PROCEDURE — 82947 ASSAY GLUCOSE BLOOD QUANT: CPT

## 2025-07-14 PROCEDURE — 84481 FREE ASSAY (FT-3): CPT

## 2025-07-14 PROCEDURE — 84439 ASSAY OF FREE THYROXINE: CPT

## 2025-07-14 PROCEDURE — 82570 ASSAY OF URINE CREATININE: CPT

## 2025-07-14 PROCEDURE — 82043 UR ALBUMIN QUANTITATIVE: CPT

## 2025-07-14 PROCEDURE — 84443 ASSAY THYROID STIM HORMONE: CPT

## 2025-07-14 PROCEDURE — 80061 LIPID PANEL: CPT

## 2025-07-14 PROCEDURE — 80053 COMPREHEN METABOLIC PANEL: CPT

## 2025-07-14 PROCEDURE — 36415 COLL VENOUS BLD VENIPUNCTURE: CPT

## 2025-07-14 RX ORDER — TIRZEPATIDE 5 MG/.5ML
5 INJECTION, SOLUTION SUBCUTANEOUS WEEKLY
Qty: 2 ML | Refills: 5 | Status: SHIPPED | OUTPATIENT
Start: 2025-08-14

## 2025-07-14 RX ORDER — TIRZEPATIDE 2.5 MG/.5ML
2.5 INJECTION, SOLUTION SUBCUTANEOUS WEEKLY
Qty: 2 ML | Refills: 0 | Status: SHIPPED | OUTPATIENT
Start: 2025-07-14

## 2025-07-14 RX ORDER — INSULIN GLARGINE-YFGN 100 [IU]/ML
40 INJECTION, SOLUTION SUBCUTANEOUS NIGHTLY
Qty: 30 ML | Refills: 1 | Status: SHIPPED | OUTPATIENT
Start: 2025-07-14

## 2025-07-14 RX ORDER — PEN NEEDLE, DIABETIC 32GX 5/32"
1 NEEDLE, DISPOSABLE MISCELLANEOUS DAILY
Qty: 100 EACH | Refills: 3 | Status: SHIPPED | OUTPATIENT
Start: 2025-07-14

## 2025-07-14 NOTE — PROGRESS NOTES
Name: Laureen Hill  Date: 6/3/2024    Referring Physician: No ref. provider found    HISTORY OF PRESENT ILLNESS   Laureen Hill is a 50 year old female who presents for follow up for diabetes mellitus     Lost to follow up for the last 12 months.     Diabetes History:  Diagnosed- diagnosed with GDM during third pregnancy and then progressed to DM type 2 immediately after.   Patient has not had hospitalizations for blood sugar issues    Prior glycohemoglobin were 9.4% 4/2024; 9.8% 3/2025; 10.3% POC today   Glucose in clinic today - 302 mg/dl    Dietary compliance: Fair- not following low CHO diet. Avoiding junk foods.      Recall:  Breakfast-  Skips, coffee in the morning  Lunch- sandwich or salad or leftovers from dinner.   Dinner- rice, with protein, vegetable   Snack- more snacking with increased stress in the last few months   Beverages- water, or coffee in the morning, occ. Soda or soda but infrequent    Exercise: Active at work     Polyuria/polydipsia: No  Blurred vision: No    Episodes of hypoglycemia: Once in the last few weeks.     Blood Glucose:  Checking 2 times daily     Fasting - 150-220  Evening - 250-300's     Current DM Regimen:  Semglee- 60 units subcutaneous nightly   Metformin - 1000 mg PO BID       Modifying factors:  Medication adherence: Yes   Recent steroids, illness or infections: Sinus infection- was put on prednisone       REVIEW OF SYSTEMS  Eyes: Diabetic retinopathy present: No            Most recent visit to eye doctor in last 12 months: Yes- > 1 year. Dr. Iqbal    CV: Cardiovascular disease present: No         Hypertension present: No         Hyperlipidemia present: No         Peripheral Vascular Disease present: No    : Nephropathy present: No    Neuro: Neuropathy present: No, denies symptoms    Skin: Infection or ulceration: No    Osteoporosis: No    Thyroid disease: Yes      Medications:     Current Outpatient Medications:     losartan 50 MG Oral Tab, Take 1 tablet (50 mg  total) by mouth daily., Disp: 90 tablet, Rfl: 3    Glucose Blood (ONETOUCH VERIO) In Vitro Strip, 1 strip by In Vitro route 2 (two) times daily., Disp: 200 strip, Rfl: 3    Insulin Glargine-yfgn (SEMGLEE, YFGN,) 100 UNIT/ML Subcutaneous Solution Pen-injector, Inject 20 Units into the skin nightly., Disp: 18 mL, Rfl: 1    Insulin Pen Needle (BD PEN NEEDLE KYRIE 2ND GEN) 32G X 4 MM Does not apply Misc, Inject 1 Needle into the skin daily., Disp: 100 each, Rfl: 3    Lancet Devices (ONETOUCH DELICA PLUS LANCING) Does not apply Misc, 1 Lancet by Finger stick route 2 (two) times daily., Disp: 1 each, Rfl: 1    Lancets (ONETOUCH DELICA PLUS [id number]) Does not apply Misc, 1 Lancet by Finger stick route 2 (two) times daily., Disp: 200 each, Rfl: 3    methIMAzole 5 MG Oral Tab, Take 1 tablet (5 mg total) by mouth daily., Disp: 90 tablet, Rfl: 3    metFORMIN HCl 1000 MG Oral Tab, Take 1 tablet (1,000 mg total) by mouth 2 (two) times daily., Disp: 180 tablet, Rfl: 3    propranolol 10 MG Oral Tab, Take 1 tablet (10 mg total) by mouth daily., Disp: 90 tablet, Rfl: 3    propranolol 10 MG Oral Tab, Take 1 tablet (10 mg total) by mouth daily., Disp: 90 tablet, Rfl: 1    atorvastatin 40 MG Oral Tab, Take 1 tablet (40 mg total) by mouth nightly., Disp: 90 tablet, Rfl: 3    dapagliflozin (FARXIGA) 10 MG Oral Tab, Take 1 tablet (10 mg total) by mouth daily. (Patient not taking: Reported on 7/14/2025), Disp: 90 tablet, Rfl: 1    Blood Glucose Monitoring Suppl (ONETOUCH VERIO) w/Device Does not apply Kit, 1 each daily., Disp: 1 kit, Rfl: 0    OneTouch Delica Lancets 33G Does not apply Misc, Test 2 x daily, Disp: 200 each, Rfl: 1    sertraline 100 MG Oral Tab, Take 0.5 tablets (50 mg total) by mouth daily., Disp: 45 tablet, Rfl: 3    Insulin Pen Needle (BD PEN NEEDLE SHORT U/F) 31G X 8 MM Does not apply Misc, Take 1 Bottle by mouth As Directed., Disp: 100 each, Rfl: 3    propranolol 10 MG Oral Tab, Take 1 tablet (10 mg total) by mouth  daily., Disp: 90 tablet, Rfl: 1     Allergies:   No Known Allergies    Social History:   Social History     Socioeconomic History    Marital status: Legally    Tobacco Use    Smoking status: Never     Passive exposure: Never    Smokeless tobacco: Never   Vaping Use    Vaping status: Never Used   Substance and Sexual Activity    Alcohol use: Never    Drug use: Never       Medical History:   Past Medical History:    Diabetes (HCC)    Hyperthyroidism       Surgical history:   Past Surgical History:   Procedure Laterality Date    D & c           PHYSICAL EXAM  Vitals:    07/14/25 1355   BP: 117/82   Pulse: 99   Weight: 185 lb (83.9 kg)   Height: 5' 7\" (1.702 m)       General Appearance:  alert, well developed, in no acute distress  Eyes:  normal conjunctivae, sclera, and normal pupils  Neck: Trachea midline: Normal  Back: no kyphosis or back tenderness  Lymph Nodes:  No abnormal nodes noted  Musculoskeletal:  normal muscle strength and tone  Skin:  normal moisture and skin texture  Hair & Nails:  normal scalp hair     Hematologic:  no excessive bruising  Neuro:  sensory grossly intact and motor grossly intact.  Psychiatric:  oriented to time, self, and place  Nutritional:  no abnormal weight gain or loss      ASSESSMENT/PLAN:    Diabetes mellitus type 2 uncontrolled   - HgA1c- 10.3% POC today   -Reviewed ABC's of diabetes   - Reviewed pathogenesis of diabetes.   - Reviewed importance of good glycemic control to prevent microvascular and macrovascular complications including nephropathy, neuropathy, retinopathy, and cardiovascular disease.  - Reviewed importance of SBGM- check glucose 2 times daily   - Reviewed target glucose goals for patient  fasting and <180 post prandially   - Reviewed importance of following diabetic diet- recommended 135 grams of CHO per day or 45 grams per meal.   - Provided patient education materials    - Continue Semglee 30 units subcutaneous daily. Reviewed insulin timing and  administration.     - Continue Metformin 1000 mg  PO BID.     - Did not start Farxiga after last visit     - Start Mounjaro 2.5mg subcutaneous once weekly x 4 weeks and then increase to 5mg subcutaneous weekly. Reviewed side effects and risks vs benefits of medication. Reviewed pen injection and dose titration today during visit.     - Reviewed importance of SBGM- discussed option of personal CGM but she declines and is not interested in CGM. Prefers to check manually- prescription for new glucometer sent to pharmacy.     -normotensive   - Lipids 3/2024- LDL- 50, on statin  - Repeat labs today   - No nephropathy- last lab test 3/2024, on losartan  - Repeat labs ordered today    - UTD with optho, reviewed importance of yearly optho follow up    RTC in 2 months   7/14/25  SALOMON Pierre    A total of  30 minutes was spent on obtaining history, reviewing pertinent imaging/labs and specialists notes, evaluating patient, providing multiple treatment options, reinforcing diet/exercise and compliance, and completing documentation.

## 2025-07-14 NOTE — PATIENT INSTRUCTIONS
Continue Semglee (insulin) 40 units subcutaneous once daily     Continue Metformin     Start Mounjaro 2.5mg subcutaneous weekly x 4 weeks and then increase to 5mg subcutaneous weekly

## 2025-07-15 ENCOUNTER — RESULTS FOLLOW-UP (OUTPATIENT)
Dept: ENDOCRINOLOGY CLINIC | Facility: CLINIC | Age: 50
End: 2025-07-15

## 2025-07-15 DIAGNOSIS — E05.90 HYPERTHYROIDISM: Primary | ICD-10-CM

## 2025-08-11 RX ORDER — PROPRANOLOL HYDROCHLORIDE 10 MG/1
10 TABLET ORAL DAILY
Qty: 90 TABLET | Refills: 1 | Status: SHIPPED | OUTPATIENT
Start: 2025-08-11

## 2025-08-22 RX ORDER — ATORVASTATIN CALCIUM 40 MG/1
40 TABLET, FILM COATED ORAL NIGHTLY
Qty: 90 TABLET | Refills: 0 | Status: SHIPPED | OUTPATIENT
Start: 2025-08-22 | End: 2025-08-23

## 2025-08-25 RX ORDER — ATORVASTATIN CALCIUM 40 MG/1
40 TABLET, FILM COATED ORAL NIGHTLY
Qty: 90 TABLET | Refills: 0 | Status: SHIPPED | OUTPATIENT
Start: 2025-08-25

## (undated) DIAGNOSIS — E11.9 TYPE 2 DIABETES MELLITUS WITHOUT COMPLICATION, WITHOUT LONG-TERM CURRENT USE OF INSULIN (HCC): ICD-10-CM

## (undated) NOTE — LETTER
8/2/2023              Glenda Hollins        [address] [address]         To Whom It May Concern,   Charan Bartholomew was seen today 8/2/23, she will return for another visit with me 8/8/23 to re-evaluate when she can return back to work. Sincerely,    Mindy Pickett MD  [facility], [address] [facility] [address] [address] [facility]  [phone]        Document electronically generated by:   Ember Lott

## (undated) NOTE — LETTER
8/21/2024          To Whom It May Concern:    Laureen Hill is currently under my medical care and was evaluated today, She may return to work without any restrictions on 8/26/2024.  Activity is restricted as follows: none.    If you require additional information please contact our office.        Sincerely,    Aidee Brantley MD

## (undated) NOTE — LETTER
8/8/2023              Robert Petty        [address] [facility] [address]         To Whom It May Concern,   Curtis Schmidt may return to work 8/14/23. Please allow breaks as needed. Sincerely,      Mitzi Falcon MD  [facility], [address] Nohemy Miranda [address] [address] [facility]  [phone]        Document electronically generated by:   Geno Rico

## (undated) NOTE — LETTER
8/8/2023              Jolynn Foreman        [address] [facility] [address]         To Whom It May Concern,    Matteo Elaine may return to work 8/14/23 without restriction. Sincerely,    Sherlyn Alex MD  [facility], [address] [facility]  [address] [facility] [address] [facility]  [phone]        Document electronically generated by:   Na Urena

## (undated) NOTE — LETTER
To Whom It May Concern:    Sarahy Roche has been under our care regarding ongoing medical issues. Because of this, she has been required to restrict her physical activities. She may resume her usual activities, including work, on Friday, June 9, 2023 with the following restrictions:    [x]  None     []    No heavy lifting (over 15 pounds) for               weeks   []    Part-time (no more than             hours per week) for               week   []  Other:        Please feel free to contact us if there are any questions.       Sincerely,        Georgia Wright MD  [facility], [address]  [phone]        Document generated by:  Georgia Wright MD

## (undated) NOTE — LETTER
2/8/2022              Ammon Hernandez        [address] [facility] [address]         Dear Curtis Kwok,    This letter is to inform you that our office has made several attempts to reach you by phone without success. We were attempting to contact you by phone regarding your lab results. Dr. Dariana Silverio needs you to schedule a follow up appointment to discuss them with you. Please contact our office at the number listed below as soon as you receive this letter to discuss this issue and to make the necessary changes in our system to your contact information. Thank you for your cooperation. Sincerely,    Armida Silverio, [address] [facility] [address] ENDOCRINOLOGY  C/ Adam De Ryan [address] [place] [address]  [phone]